# Patient Record
Sex: MALE | Race: WHITE | HISPANIC OR LATINO | Employment: OTHER | URBAN - METROPOLITAN AREA
[De-identification: names, ages, dates, MRNs, and addresses within clinical notes are randomized per-mention and may not be internally consistent; named-entity substitution may affect disease eponyms.]

---

## 2017-11-13 ENCOUNTER — HOSPITAL ENCOUNTER (OUTPATIENT)
Dept: LAB | Facility: MEDICAL CENTER | Age: 47
End: 2017-11-13
Attending: STUDENT IN AN ORGANIZED HEALTH CARE EDUCATION/TRAINING PROGRAM
Payer: COMMERCIAL

## 2017-11-13 LAB
CHOLEST SERPL-MCNC: 193 MG/DL (ref 100–199)
EST. AVERAGE GLUCOSE BLD GHB EST-MCNC: 131 MG/DL
HBA1C MFR BLD: 6.2 % (ref 0–5.6)
HDLC SERPL-MCNC: 42 MG/DL
LDLC SERPL CALC-MCNC: 118 MG/DL
TRIGL SERPL-MCNC: 167 MG/DL (ref 0–149)

## 2017-11-13 PROCEDURE — 80061 LIPID PANEL: CPT

## 2017-11-13 PROCEDURE — 83036 HEMOGLOBIN GLYCOSYLATED A1C: CPT

## 2017-11-13 PROCEDURE — 36415 COLL VENOUS BLD VENIPUNCTURE: CPT

## 2017-12-11 ENCOUNTER — HOSPITAL ENCOUNTER (OUTPATIENT)
Dept: RADIOLOGY | Facility: MEDICAL CENTER | Age: 47
End: 2017-12-11
Attending: STUDENT IN AN ORGANIZED HEALTH CARE EDUCATION/TRAINING PROGRAM
Payer: COMMERCIAL

## 2017-12-11 DIAGNOSIS — M54.5 LOW BACK PAIN, UNSPECIFIED BACK PAIN LATERALITY, UNSPECIFIED CHRONICITY, WITH SCIATICA PRESENCE UNSPECIFIED: ICD-10-CM

## 2017-12-11 PROCEDURE — 72100 X-RAY EXAM L-S SPINE 2/3 VWS: CPT

## 2019-04-11 ENCOUNTER — NON-PROVIDER VISIT (OUTPATIENT)
Dept: OCCUPATIONAL MEDICINE | Facility: CLINIC | Age: 49
End: 2019-04-11

## 2019-04-11 DIAGNOSIS — Z23 NEED FOR HEPATITIS B VACCINATION: ICD-10-CM

## 2019-04-11 PROCEDURE — 90746 HEPB VACCINE 3 DOSE ADULT IM: CPT | Performed by: PREVENTIVE MEDICINE

## 2020-02-23 ENCOUNTER — HOSPITAL ENCOUNTER (EMERGENCY)
Facility: MEDICAL CENTER | Age: 50
End: 2020-02-23
Attending: EMERGENCY MEDICINE
Payer: COMMERCIAL

## 2020-02-23 VITALS
HEIGHT: 67 IN | SYSTOLIC BLOOD PRESSURE: 105 MMHG | DIASTOLIC BLOOD PRESSURE: 78 MMHG | TEMPERATURE: 100.1 F | RESPIRATION RATE: 18 BRPM | OXYGEN SATURATION: 98 % | HEART RATE: 98 BPM | WEIGHT: 208.11 LBS | BODY MASS INDEX: 32.66 KG/M2

## 2020-02-23 DIAGNOSIS — J10.1 INFLUENZA A: ICD-10-CM

## 2020-02-23 LAB
FLUAV RNA SPEC QL NAA+PROBE: POSITIVE
FLUBV RNA SPEC QL NAA+PROBE: NEGATIVE
S PYO AG THROAT QL: NORMAL
SIGNIFICANT IND 70042: NORMAL
SITE SITE: NORMAL
SOURCE SOURCE: NORMAL

## 2020-02-23 PROCEDURE — 87081 CULTURE SCREEN ONLY: CPT

## 2020-02-23 PROCEDURE — 700102 HCHG RX REV CODE 250 W/ 637 OVERRIDE(OP): Performed by: EMERGENCY MEDICINE

## 2020-02-23 PROCEDURE — 87880 STREP A ASSAY W/OPTIC: CPT

## 2020-02-23 PROCEDURE — 99283 EMERGENCY DEPT VISIT LOW MDM: CPT

## 2020-02-23 PROCEDURE — A9270 NON-COVERED ITEM OR SERVICE: HCPCS | Performed by: EMERGENCY MEDICINE

## 2020-02-23 PROCEDURE — 87502 INFLUENZA DNA AMP PROBE: CPT

## 2020-02-23 RX ORDER — OSELTAMIVIR PHOSPHATE 75 MG/1
75 CAPSULE ORAL 2 TIMES DAILY
Qty: 10 CAP | Refills: 0 | Status: SHIPPED | OUTPATIENT
Start: 2020-02-23

## 2020-02-23 RX ORDER — ACETAMINOPHEN 325 MG/1
650 TABLET ORAL ONCE
Status: COMPLETED | OUTPATIENT
Start: 2020-02-23 | End: 2020-02-23

## 2020-02-23 RX ADMIN — ACETAMINOPHEN 650 MG: 325 TABLET, FILM COATED ORAL at 15:30

## 2020-02-23 NOTE — ED TRIAGE NOTES
"C/O sore throat, and non productive cough recurring since yesterday.  Chief Complaint   Patient presents with   • Sore Throat   • Cough     /68   Pulse (!) 110   Temp 37.8 °C (100.1 °F) (Oral)   Resp 20   Ht 1.702 m (5' 7\")   Wt 94.4 kg (208 lb 1.8 oz)   SpO2 99%   BMI 32.60 kg/m²     "

## 2020-02-24 NOTE — DISCHARGE INSTRUCTIONS
Stay home while you are sick.  Use Tylenol and Motrin if needed for fever or discomfort, drink lots of fluids to maintain hydration.  Return here if you develop new or worsening symptoms.  Next year get a flu shot.

## 2020-02-24 NOTE — ED PROVIDER NOTES
ED Provider Note    CHIEF COMPLAINT  Chief Complaint   Patient presents with   • Sore Throat   • Cough       HPI  Noe Cottrell is a 49 y.o. male who presents to the emergency department complaining that he woke up this morning with body aches generalized pain sore throat nonproductive cough.    REVIEW OF SYSTEMS no chest pain no hemoptysis no vomiting or diarrhea.  All other systems negative    PAST MEDICAL HISTORY  History reviewed. No pertinent past medical history.    FAMILY HISTORY  History reviewed. No pertinent family history.    SOCIAL HISTORY  Social History     Socioeconomic History   • Marital status:      Spouse name: Not on file   • Number of children: Not on file   • Years of education: Not on file   • Highest education level: Not on file   Occupational History   • Not on file   Social Needs   • Financial resource strain: Not on file   • Food insecurity     Worry: Not on file     Inability: Not on file   • Transportation needs     Medical: Not on file     Non-medical: Not on file   Tobacco Use   • Smoking status: Never Smoker   • Smokeless tobacco: Never Used   Substance and Sexual Activity   • Alcohol use: Yes     Comment: occasionally   • Drug use: Never   • Sexual activity: Not on file   Lifestyle   • Physical activity     Days per week: Not on file     Minutes per session: Not on file   • Stress: Not on file   Relationships   • Social connections     Talks on phone: Not on file     Gets together: Not on file     Attends Bahai service: Not on file     Active member of club or organization: Not on file     Attends meetings of clubs or organizations: Not on file     Relationship status: Not on file   • Intimate partner violence     Fear of current or ex partner: Not on file     Emotionally abused: Not on file     Physically abused: Not on file     Forced sexual activity: Not on file   Other Topics Concern   • Not on file   Social History Narrative   • Not on file       SURGICAL  "HISTORY  History reviewed. No pertinent surgical history.    CURRENT MEDICATIONS  Home Medications    **Home medications have not yet been reviewed for this encounter**         ALLERGIES  No Known Allergies    PHYSICAL EXAM  VITAL SIGNS: /78   Pulse 98   Temp 37.8 °C (100.1 °F) (Oral)   Resp 18   Ht 1.702 m (5' 7\")   Wt 94.4 kg (208 lb 1.8 oz)   SpO2 98%   BMI 32.60 kg/m²    Oxygen saturation is interpreted as adequate  Constitutional: Awake and nontoxic-appearing  HENT: Mucous membranes are moist throat clear  Eyes: No erythema or discharge  Neck: No lymphadenopathy or meningeal findings  Cardiovascular: Regular rate and rhythm  Lungs: Clear and equal bilaterally with no apparent difficulty breathing  Skin: Warm and dry  Musculoskeletal: No acute bony deformity  Neurologic: Awake verbal and ambulatory    Laboratory  Influenza A test is positive, rapid strep test is negative    MEDICAL DECISION MAKING and DISPOSITION  In the emergency department I have reviewed these findings with the patient and I have written him a prescription for Tamiflu.  I think it is safe for him to go home I recommended Tylenol and Motrin if needed for fever or discomfort and the patient was given a dose of Tylenol here in the ER.  If he is having new or worsening symptoms he is to return for recheck and I have encouraged him to get a flu shot next year    IMPRESSION  1.  Influenza A         Electronically signed by: Heron Denise M.D., 2/23/2020 5:43 PM      "

## 2020-02-25 LAB
S PYO SPEC QL CULT: NORMAL
SIGNIFICANT IND 70042: NORMAL
SITE SITE: NORMAL
SOURCE SOURCE: NORMAL

## 2020-05-20 ENCOUNTER — HOSPITAL ENCOUNTER (OUTPATIENT)
Facility: MEDICAL CENTER | Age: 50
End: 2020-05-20
Payer: COMMERCIAL

## 2020-05-23 LAB
SARS-COV-2 RNA SPEC QL NAA+PROBE: NOT DETECTED
SPECIMEN SOURCE: NORMAL

## 2020-10-15 ENCOUNTER — IMMUNIZATION (OUTPATIENT)
Dept: SOCIAL WORK | Facility: CLINIC | Age: 50
End: 2020-10-15
Payer: COMMERCIAL

## 2020-10-15 DIAGNOSIS — Z23 NEED FOR VACCINATION: ICD-10-CM

## 2020-10-15 PROCEDURE — 90471 IMMUNIZATION ADMIN: CPT | Performed by: REGISTERED NURSE

## 2020-10-15 PROCEDURE — 90686 IIV4 VACC NO PRSV 0.5 ML IM: CPT | Performed by: REGISTERED NURSE

## 2021-04-13 ENCOUNTER — HOSPITAL ENCOUNTER (OUTPATIENT)
Dept: LAB | Facility: MEDICAL CENTER | Age: 51
End: 2021-04-13
Attending: STUDENT IN AN ORGANIZED HEALTH CARE EDUCATION/TRAINING PROGRAM
Payer: COMMERCIAL

## 2021-04-13 LAB
ALBUMIN SERPL BCP-MCNC: 4.7 G/DL (ref 3.2–4.9)
ALBUMIN/GLOB SERPL: 1.7 G/DL
ALP SERPL-CCNC: 73 U/L (ref 30–99)
ALT SERPL-CCNC: 28 U/L (ref 2–50)
ANION GAP SERPL CALC-SCNC: 9 MMOL/L (ref 7–16)
AST SERPL-CCNC: 29 U/L (ref 12–45)
BASOPHILS # BLD AUTO: 0.9 % (ref 0–1.8)
BASOPHILS # BLD: 0.05 K/UL (ref 0–0.12)
BILIRUB SERPL-MCNC: 0.5 MG/DL (ref 0.1–1.5)
BUN SERPL-MCNC: 15 MG/DL (ref 8–22)
CALCIUM SERPL-MCNC: 9.4 MG/DL (ref 8.5–10.5)
CHLORIDE SERPL-SCNC: 103 MMOL/L (ref 96–112)
CHOLEST SERPL-MCNC: 207 MG/DL (ref 100–199)
CO2 SERPL-SCNC: 23 MMOL/L (ref 20–33)
CREAT SERPL-MCNC: 0.96 MG/DL (ref 0.5–1.4)
EOSINOPHIL # BLD AUTO: 0.22 K/UL (ref 0–0.51)
EOSINOPHIL NFR BLD: 4.1 % (ref 0–6.9)
ERYTHROCYTE [DISTWIDTH] IN BLOOD BY AUTOMATED COUNT: 43.8 FL (ref 35.9–50)
EST. AVERAGE GLUCOSE BLD GHB EST-MCNC: 111 MG/DL
FASTING STATUS PATIENT QL REPORTED: NORMAL
GLOBULIN SER CALC-MCNC: 2.7 G/DL (ref 1.9–3.5)
GLUCOSE SERPL-MCNC: 99 MG/DL (ref 65–99)
HBA1C MFR BLD: 5.5 % (ref 4–5.6)
HCT VFR BLD AUTO: 46.8 % (ref 42–52)
HDLC SERPL-MCNC: 42 MG/DL
HGB BLD-MCNC: 16.1 G/DL (ref 14–18)
IMM GRANULOCYTES # BLD AUTO: 0.02 K/UL (ref 0–0.11)
IMM GRANULOCYTES NFR BLD AUTO: 0.4 % (ref 0–0.9)
LDLC SERPL CALC-MCNC: 142 MG/DL
LYMPHOCYTES # BLD AUTO: 1.71 K/UL (ref 1–4.8)
LYMPHOCYTES NFR BLD: 31.5 % (ref 22–41)
MCH RBC QN AUTO: 29.3 PG (ref 27–33)
MCHC RBC AUTO-ENTMCNC: 34.4 G/DL (ref 33.7–35.3)
MCV RBC AUTO: 85.2 FL (ref 81.4–97.8)
MONOCYTES # BLD AUTO: 0.4 K/UL (ref 0–0.85)
MONOCYTES NFR BLD AUTO: 7.4 % (ref 0–13.4)
NEUTROPHILS # BLD AUTO: 3.02 K/UL (ref 1.82–7.42)
NEUTROPHILS NFR BLD: 55.7 % (ref 44–72)
NRBC # BLD AUTO: 0 K/UL
NRBC BLD-RTO: 0 /100 WBC
PLATELET # BLD AUTO: 197 K/UL (ref 164–446)
PMV BLD AUTO: 12 FL (ref 9–12.9)
POTASSIUM SERPL-SCNC: 4 MMOL/L (ref 3.6–5.5)
PROT SERPL-MCNC: 7.4 G/DL (ref 6–8.2)
PSA SERPL-MCNC: 1.2 NG/ML (ref 0–4)
RBC # BLD AUTO: 5.49 M/UL (ref 4.7–6.1)
SODIUM SERPL-SCNC: 135 MMOL/L (ref 135–145)
TRIGL SERPL-MCNC: 117 MG/DL (ref 0–149)
TSH SERPL DL<=0.005 MIU/L-ACNC: 1.61 UIU/ML (ref 0.38–5.33)
WBC # BLD AUTO: 5.4 K/UL (ref 4.8–10.8)

## 2021-04-13 PROCEDURE — 80053 COMPREHEN METABOLIC PANEL: CPT

## 2021-04-13 PROCEDURE — 36415 COLL VENOUS BLD VENIPUNCTURE: CPT

## 2021-04-13 PROCEDURE — 82306 VITAMIN D 25 HYDROXY: CPT

## 2021-04-13 PROCEDURE — 83036 HEMOGLOBIN GLYCOSYLATED A1C: CPT

## 2021-04-13 PROCEDURE — 80061 LIPID PANEL: CPT

## 2021-04-13 PROCEDURE — 84153 ASSAY OF PSA TOTAL: CPT

## 2021-04-13 PROCEDURE — 84443 ASSAY THYROID STIM HORMONE: CPT

## 2021-04-13 PROCEDURE — 85025 COMPLETE CBC W/AUTO DIFF WBC: CPT

## 2021-04-15 LAB — 25(OH)D3 SERPL-MCNC: 39 NG/ML (ref 30–80)

## 2022-07-05 ENCOUNTER — HOSPITAL ENCOUNTER (OUTPATIENT)
Dept: LAB | Facility: MEDICAL CENTER | Age: 52
End: 2022-07-05
Attending: STUDENT IN AN ORGANIZED HEALTH CARE EDUCATION/TRAINING PROGRAM
Payer: COMMERCIAL

## 2022-07-05 LAB
25(OH)D3 SERPL-MCNC: 28 NG/ML (ref 30–100)
ALBUMIN SERPL BCP-MCNC: 4.6 G/DL (ref 3.2–4.9)
ALBUMIN/GLOB SERPL: 1.8 G/DL
ALP SERPL-CCNC: 72 U/L (ref 30–99)
ALT SERPL-CCNC: 20 U/L (ref 2–50)
ANION GAP SERPL CALC-SCNC: 11 MMOL/L (ref 7–16)
AST SERPL-CCNC: 20 U/L (ref 12–45)
BASOPHILS # BLD AUTO: 0.8 % (ref 0–1.8)
BASOPHILS # BLD: 0.04 K/UL (ref 0–0.12)
BILIRUB SERPL-MCNC: 0.5 MG/DL (ref 0.1–1.5)
BUN SERPL-MCNC: 14 MG/DL (ref 8–22)
CALCIUM SERPL-MCNC: 9 MG/DL (ref 8.4–10.2)
CHLORIDE SERPL-SCNC: 107 MMOL/L (ref 96–112)
CHOLEST SERPL-MCNC: 204 MG/DL (ref 100–199)
CO2 SERPL-SCNC: 22 MMOL/L (ref 20–33)
CREAT SERPL-MCNC: 0.97 MG/DL (ref 0.5–1.4)
EOSINOPHIL # BLD AUTO: 0.12 K/UL (ref 0–0.51)
EOSINOPHIL NFR BLD: 2.3 % (ref 0–6.9)
ERYTHROCYTE [DISTWIDTH] IN BLOOD BY AUTOMATED COUNT: 45.8 FL (ref 35.9–50)
EST. AVERAGE GLUCOSE BLD GHB EST-MCNC: 111 MG/DL
FASTING STATUS PATIENT QL REPORTED: NORMAL
GFR SERPLBLD CREATININE-BSD FMLA CKD-EPI: 93 ML/MIN/1.73 M 2
GLOBULIN SER CALC-MCNC: 2.5 G/DL (ref 1.9–3.5)
GLUCOSE SERPL-MCNC: 98 MG/DL (ref 65–99)
HBA1C MFR BLD: 5.5 % (ref 4–5.6)
HCT VFR BLD AUTO: 45.8 % (ref 42–52)
HDLC SERPL-MCNC: 46 MG/DL
HGB BLD-MCNC: 15.6 G/DL (ref 14–18)
IMM GRANULOCYTES # BLD AUTO: 0.03 K/UL (ref 0–0.11)
IMM GRANULOCYTES NFR BLD AUTO: 0.6 % (ref 0–0.9)
LDLC SERPL CALC-MCNC: 134 MG/DL
LYMPHOCYTES # BLD AUTO: 1.71 K/UL (ref 1–4.8)
LYMPHOCYTES NFR BLD: 32.1 % (ref 22–41)
MCH RBC QN AUTO: 29.1 PG (ref 27–33)
MCHC RBC AUTO-ENTMCNC: 34.1 G/DL (ref 33.7–35.3)
MCV RBC AUTO: 85.4 FL (ref 81.4–97.8)
MONOCYTES # BLD AUTO: 0.47 K/UL (ref 0–0.85)
MONOCYTES NFR BLD AUTO: 8.8 % (ref 0–13.4)
NEUTROPHILS # BLD AUTO: 2.95 K/UL (ref 1.82–7.42)
NEUTROPHILS NFR BLD: 55.4 % (ref 44–72)
NRBC # BLD AUTO: 0 K/UL
NRBC BLD-RTO: 0 /100 WBC
PLATELET # BLD AUTO: 197 K/UL (ref 164–446)
PMV BLD AUTO: 10.5 FL (ref 9–12.9)
POTASSIUM SERPL-SCNC: 4 MMOL/L (ref 3.6–5.5)
PROT SERPL-MCNC: 7.1 G/DL (ref 6–8.2)
PSA SERPL-MCNC: 1.5 NG/ML (ref 0–4)
RBC # BLD AUTO: 5.36 M/UL (ref 4.7–6.1)
SODIUM SERPL-SCNC: 140 MMOL/L (ref 135–145)
TRIGL SERPL-MCNC: 119 MG/DL (ref 0–149)
WBC # BLD AUTO: 5.3 K/UL (ref 4.8–10.8)

## 2022-07-05 PROCEDURE — 80053 COMPREHEN METABOLIC PANEL: CPT

## 2022-07-05 PROCEDURE — 84270 ASSAY OF SEX HORMONE GLOBUL: CPT

## 2022-07-05 PROCEDURE — 84403 ASSAY OF TOTAL TESTOSTERONE: CPT

## 2022-07-05 PROCEDURE — 82306 VITAMIN D 25 HYDROXY: CPT

## 2022-07-05 PROCEDURE — 36415 COLL VENOUS BLD VENIPUNCTURE: CPT

## 2022-07-05 PROCEDURE — 80061 LIPID PANEL: CPT

## 2022-07-05 PROCEDURE — 84402 ASSAY OF FREE TESTOSTERONE: CPT

## 2022-07-05 PROCEDURE — 84153 ASSAY OF PSA TOTAL: CPT

## 2022-07-05 PROCEDURE — 83036 HEMOGLOBIN GLYCOSYLATED A1C: CPT

## 2022-07-05 PROCEDURE — 85025 COMPLETE CBC W/AUTO DIFF WBC: CPT

## 2022-07-06 LAB
SHBG SERPL-SCNC: 25 NMOL/L (ref 19–76)
TESTOST FREE MFR SERPL: 2.1 % (ref 1.6–2.9)
TESTOST FREE SERPL-MCNC: 68 PG/ML (ref 47–244)
TESTOST SERPL-MCNC: 327 NG/DL (ref 300–890)

## 2022-08-08 ENCOUNTER — HOSPITAL ENCOUNTER (OUTPATIENT)
Dept: LAB | Facility: MEDICAL CENTER | Age: 52
End: 2022-08-08
Attending: STUDENT IN AN ORGANIZED HEALTH CARE EDUCATION/TRAINING PROGRAM
Payer: COMMERCIAL

## 2022-08-08 LAB
APTT PPP: 27.6 SEC (ref 24.7–36)
CHOLEST SERPL-MCNC: 214 MG/DL (ref 100–199)
FASTING STATUS PATIENT QL REPORTED: NORMAL
FERRITIN SERPL-MCNC: 136 NG/ML (ref 22–322)
HDLC SERPL-MCNC: 46 MG/DL
HIV 1+2 AB+HIV1 P24 AG SERPL QL IA: NORMAL
INR PPP: 1.03 (ref 0.87–1.13)
IRON SATN MFR SERPL: 37 % (ref 15–55)
IRON SERPL-MCNC: 109 UG/DL (ref 50–180)
LDLC SERPL CALC-MCNC: 145 MG/DL
MAGNESIUM SERPL-MCNC: 2.2 MG/DL (ref 1.5–2.5)
PROTHROMBIN TIME: 13.1 SEC (ref 12–14.6)
T PALLIDUM AB SER QL IA: NORMAL
T3FREE SERPL-MCNC: 2.9 PG/ML (ref 2–4.4)
T4 FREE SERPL-MCNC: 1.13 NG/DL (ref 0.93–1.7)
TIBC SERPL-MCNC: 294 UG/DL (ref 250–450)
TRANSFERRIN SERPL-MCNC: 265 MG/DL (ref 200–370)
TRIGL SERPL-MCNC: 116 MG/DL (ref 0–149)
TSH SERPL DL<=0.005 MIU/L-ACNC: 1.59 UIU/ML (ref 0.38–5.33)
UIBC SERPL-MCNC: 185 UG/DL (ref 110–370)
VIT B12 SERPL-MCNC: 536 PG/ML (ref 211–911)

## 2022-08-08 PROCEDURE — 84466 ASSAY OF TRANSFERRIN: CPT

## 2022-08-08 PROCEDURE — 84481 FREE ASSAY (FT-3): CPT

## 2022-08-08 PROCEDURE — 36415 COLL VENOUS BLD VENIPUNCTURE: CPT

## 2022-08-08 PROCEDURE — 83735 ASSAY OF MAGNESIUM: CPT

## 2022-08-08 PROCEDURE — 85730 THROMBOPLASTIN TIME PARTIAL: CPT

## 2022-08-08 PROCEDURE — 82607 VITAMIN B-12: CPT

## 2022-08-08 PROCEDURE — 80061 LIPID PANEL: CPT

## 2022-08-08 PROCEDURE — 84443 ASSAY THYROID STIM HORMONE: CPT

## 2022-08-08 PROCEDURE — 87389 HIV-1 AG W/HIV-1&-2 AB AG IA: CPT

## 2022-08-08 PROCEDURE — 86780 TREPONEMA PALLIDUM: CPT

## 2022-08-08 PROCEDURE — 82728 ASSAY OF FERRITIN: CPT

## 2022-08-08 PROCEDURE — 82525 ASSAY OF COPPER: CPT

## 2022-08-08 PROCEDURE — 83550 IRON BINDING TEST: CPT

## 2022-08-08 PROCEDURE — 81401 MOPATH PROCEDURE LEVEL 2: CPT

## 2022-08-08 PROCEDURE — 86038 ANTINUCLEAR ANTIBODIES: CPT

## 2022-08-08 PROCEDURE — 84439 ASSAY OF FREE THYROXINE: CPT

## 2022-08-08 PROCEDURE — 85610 PROTHROMBIN TIME: CPT

## 2022-08-08 PROCEDURE — 83540 ASSAY OF IRON: CPT

## 2022-08-10 LAB — NUCLEAR IGG SER QL IA: NORMAL

## 2022-08-11 LAB — COPPER SERPL-MCNC: 101.6 UG/DL (ref 70–140)

## 2022-08-16 LAB — TEST NAME 95000: ABNORMAL

## 2022-10-15 ENCOUNTER — PHARMACY VISIT (OUTPATIENT)
Dept: PHARMACY | Facility: MEDICAL CENTER | Age: 52
End: 2022-10-15
Payer: COMMERCIAL

## 2022-10-15 PROCEDURE — RXMED WILLOW AMBULATORY MEDICATION CHARGE: Performed by: INTERNAL MEDICINE

## 2022-10-15 RX ORDER — INFLUENZA A VIRUS A/BRISBANE/02/2018 IVR-190 (H1N1) ANTIGEN (FORMALDEHYDE INACTIVATED), INFLUENZA A VIRUS A/KANSAS/14/2017 X-327 (H3N2) ANTIGEN (FORMALDEHYDE INACTIVATED), INFLUENZA B VIRUS B/PHUKET/3073/2013 ANTIGEN (FORMALDEHYDE INACTIVATED), AND INFLUENZA B VIRUS B/MARYLAND/15/2016 BX-69A ANTIGEN (FORMALDEHYDE INACTIVATED) 15; 15; 15; 15 UG/.5ML; UG/.5ML; UG/.5ML; UG/.5ML
INJECTION, SUSPENSION INTRAMUSCULAR
Qty: 0.5 ML | Refills: 0 | OUTPATIENT
Start: 2022-10-05

## 2022-11-08 ENCOUNTER — HOSPITAL ENCOUNTER (OUTPATIENT)
Dept: RADIOLOGY | Facility: MEDICAL CENTER | Age: 52
End: 2022-11-08
Attending: STUDENT IN AN ORGANIZED HEALTH CARE EDUCATION/TRAINING PROGRAM
Payer: COMMERCIAL

## 2022-11-08 DIAGNOSIS — F03.90 SENILE DEMENTIA, UNCOMPLICATED (HCC): ICD-10-CM

## 2022-11-18 ENCOUNTER — NON-PROVIDER VISIT (OUTPATIENT)
Dept: OCCUPATIONAL MEDICINE | Facility: CLINIC | Age: 52
End: 2022-11-18

## 2022-11-18 DIAGNOSIS — Z23 ENCOUNTER FOR IMMUNIZATION: ICD-10-CM

## 2022-11-18 PROCEDURE — 90746 HEPB VACCINE 3 DOSE ADULT IM: CPT | Performed by: PREVENTIVE MEDICINE

## 2022-12-07 ENCOUNTER — APPOINTMENT (OUTPATIENT)
Dept: RADIOLOGY | Facility: MEDICAL CENTER | Age: 52
End: 2022-12-07
Attending: STUDENT IN AN ORGANIZED HEALTH CARE EDUCATION/TRAINING PROGRAM
Payer: COMMERCIAL

## 2022-12-07 DIAGNOSIS — F03.90 SENILE DEMENTIA, UNCOMPLICATED (HCC): ICD-10-CM

## 2022-12-07 PROCEDURE — 70553 MRI BRAIN STEM W/O & W/DYE: CPT

## 2022-12-07 PROCEDURE — A9576 INJ PROHANCE MULTIPACK: HCPCS | Performed by: STUDENT IN AN ORGANIZED HEALTH CARE EDUCATION/TRAINING PROGRAM

## 2022-12-07 PROCEDURE — 700117 HCHG RX CONTRAST REV CODE 255: Performed by: STUDENT IN AN ORGANIZED HEALTH CARE EDUCATION/TRAINING PROGRAM

## 2022-12-07 RX ADMIN — GADOTERIDOL 20 ML: 279.3 INJECTION, SOLUTION INTRAVENOUS at 16:50

## 2022-12-16 ENCOUNTER — TELEPHONE (OUTPATIENT)
Dept: HEALTH INFORMATION MANAGEMENT | Facility: OTHER | Age: 52
End: 2022-12-16

## 2023-03-08 ENCOUNTER — HOSPITAL ENCOUNTER (OUTPATIENT)
Dept: LAB | Facility: MEDICAL CENTER | Age: 53
End: 2023-03-08
Attending: STUDENT IN AN ORGANIZED HEALTH CARE EDUCATION/TRAINING PROGRAM
Payer: COMMERCIAL

## 2023-03-08 LAB
ALBUMIN SERPL BCP-MCNC: 4.5 G/DL (ref 3.2–4.9)
ALBUMIN/GLOB SERPL: 1.6 G/DL
ALP SERPL-CCNC: 80 U/L (ref 30–99)
ALT SERPL-CCNC: 30 U/L (ref 2–50)
ANION GAP SERPL CALC-SCNC: 9 MMOL/L (ref 7–16)
AST SERPL-CCNC: 26 U/L (ref 12–45)
BASOPHILS # BLD AUTO: 0.7 % (ref 0–1.8)
BASOPHILS # BLD: 0.04 K/UL (ref 0–0.12)
BILIRUB SERPL-MCNC: 0.5 MG/DL (ref 0.1–1.5)
BUN SERPL-MCNC: 13 MG/DL (ref 8–22)
CALCIUM ALBUM COR SERPL-MCNC: 8.8 MG/DL (ref 8.5–10.5)
CALCIUM SERPL-MCNC: 9.2 MG/DL (ref 8.4–10.2)
CHLORIDE SERPL-SCNC: 105 MMOL/L (ref 96–112)
CHOLEST SERPL-MCNC: 115 MG/DL (ref 100–199)
CO2 SERPL-SCNC: 25 MMOL/L (ref 20–33)
CREAT SERPL-MCNC: 0.91 MG/DL (ref 0.5–1.4)
CREAT UR-MCNC: 157.3 MG/DL
EOSINOPHIL # BLD AUTO: 0.21 K/UL (ref 0–0.51)
EOSINOPHIL NFR BLD: 3.5 % (ref 0–6.9)
ERYTHROCYTE [DISTWIDTH] IN BLOOD BY AUTOMATED COUNT: 45.4 FL (ref 35.9–50)
EST. AVERAGE GLUCOSE BLD GHB EST-MCNC: 117 MG/DL
ESTRADIOL SERPL-MCNC: 26.7 PG/ML
FASTING STATUS PATIENT QL REPORTED: NORMAL
GFR SERPLBLD CREATININE-BSD FMLA CKD-EPI: 100 ML/MIN/1.73 M 2
GLOBULIN SER CALC-MCNC: 2.8 G/DL (ref 1.9–3.5)
GLUCOSE SERPL-MCNC: 103 MG/DL (ref 65–99)
HBA1C MFR BLD: 5.7 % (ref 4–5.6)
HCT VFR BLD AUTO: 49.2 % (ref 42–52)
HDLC SERPL-MCNC: 47 MG/DL
HGB BLD-MCNC: 16.3 G/DL (ref 14–18)
IMM GRANULOCYTES # BLD AUTO: 0.03 K/UL (ref 0–0.11)
IMM GRANULOCYTES NFR BLD AUTO: 0.5 % (ref 0–0.9)
LDLC SERPL CALC-MCNC: 51 MG/DL
LYMPHOCYTES # BLD AUTO: 1.45 K/UL (ref 1–4.8)
LYMPHOCYTES NFR BLD: 23.9 % (ref 22–41)
MCH RBC QN AUTO: 28.6 PG (ref 27–33)
MCHC RBC AUTO-ENTMCNC: 33.1 G/DL (ref 33.7–35.3)
MCV RBC AUTO: 86.3 FL (ref 81.4–97.8)
MICROALBUMIN UR-MCNC: <1.2 MG/DL
MICROALBUMIN/CREAT UR: NORMAL MG/G (ref 0–30)
MONOCYTES # BLD AUTO: 0.34 K/UL (ref 0–0.85)
MONOCYTES NFR BLD AUTO: 5.6 % (ref 0–13.4)
NEUTROPHILS # BLD AUTO: 3.99 K/UL (ref 1.82–7.42)
NEUTROPHILS NFR BLD: 65.8 % (ref 44–72)
NRBC # BLD AUTO: 0 K/UL
NRBC BLD-RTO: 0 /100 WBC
PLATELET # BLD AUTO: 200 K/UL (ref 164–446)
PMV BLD AUTO: 11.2 FL (ref 9–12.9)
POTASSIUM SERPL-SCNC: 4.5 MMOL/L (ref 3.6–5.5)
PROT SERPL-MCNC: 7.3 G/DL (ref 6–8.2)
RBC # BLD AUTO: 5.7 M/UL (ref 4.7–6.1)
SODIUM SERPL-SCNC: 139 MMOL/L (ref 135–145)
T3FREE SERPL-MCNC: 3.13 PG/ML (ref 2–4.4)
T4 FREE SERPL-MCNC: 1.07 NG/DL (ref 0.93–1.7)
TESTOST SERPL-MCNC: 316 NG/DL (ref 175–781)
TRIGL SERPL-MCNC: 87 MG/DL (ref 0–149)
TSH SERPL DL<=0.005 MIU/L-ACNC: 1.4 UIU/ML (ref 0.38–5.33)
WBC # BLD AUTO: 6.1 K/UL (ref 4.8–10.8)

## 2023-03-08 PROCEDURE — 82043 UR ALBUMIN QUANTITATIVE: CPT

## 2023-03-08 PROCEDURE — 82570 ASSAY OF URINE CREATININE: CPT

## 2023-03-08 PROCEDURE — 80053 COMPREHEN METABOLIC PANEL: CPT

## 2023-03-08 PROCEDURE — 84481 FREE ASSAY (FT-3): CPT

## 2023-03-08 PROCEDURE — 84443 ASSAY THYROID STIM HORMONE: CPT

## 2023-03-08 PROCEDURE — 82670 ASSAY OF TOTAL ESTRADIOL: CPT

## 2023-03-08 PROCEDURE — 80061 LIPID PANEL: CPT

## 2023-03-08 PROCEDURE — 36415 COLL VENOUS BLD VENIPUNCTURE: CPT

## 2023-03-08 PROCEDURE — 84439 ASSAY OF FREE THYROXINE: CPT

## 2023-03-08 PROCEDURE — 84402 ASSAY OF FREE TESTOSTERONE: CPT

## 2023-03-08 PROCEDURE — 84403 ASSAY OF TOTAL TESTOSTERONE: CPT

## 2023-03-08 PROCEDURE — 83036 HEMOGLOBIN GLYCOSYLATED A1C: CPT

## 2023-03-08 PROCEDURE — 85025 COMPLETE CBC W/AUTO DIFF WBC: CPT

## 2023-03-09 LAB — TESTOST FREE SERPL-MCNC: 65 PG/ML (ref 47–244)

## 2023-04-25 ENCOUNTER — HOSPITAL ENCOUNTER (OUTPATIENT)
Dept: LAB | Facility: MEDICAL CENTER | Age: 53
End: 2023-04-25
Attending: STUDENT IN AN ORGANIZED HEALTH CARE EDUCATION/TRAINING PROGRAM
Payer: COMMERCIAL

## 2023-04-25 LAB
BASOPHILS # BLD AUTO: 1 % (ref 0–1.8)
BASOPHILS # BLD: 0.07 K/UL (ref 0–0.12)
EOSINOPHIL # BLD AUTO: 1.72 K/UL (ref 0–0.51)
EOSINOPHIL NFR BLD: 24.8 % (ref 0–6.9)
ERYTHROCYTE [DISTWIDTH] IN BLOOD BY AUTOMATED COUNT: 45 FL (ref 35.9–50)
FOLATE SERPL-MCNC: 11.3 NG/ML
HCT VFR BLD AUTO: 46.8 % (ref 42–52)
HGB BLD-MCNC: 15.8 G/DL (ref 14–18)
IMM GRANULOCYTES # BLD AUTO: 0.03 K/UL (ref 0–0.11)
IMM GRANULOCYTES NFR BLD AUTO: 0.4 % (ref 0–0.9)
LYMPHOCYTES # BLD AUTO: 2.1 K/UL (ref 1–4.8)
LYMPHOCYTES NFR BLD: 30.3 % (ref 22–41)
MCH RBC QN AUTO: 28.8 PG (ref 27–33)
MCHC RBC AUTO-ENTMCNC: 33.8 G/DL (ref 33.7–35.3)
MCV RBC AUTO: 85.2 FL (ref 81.4–97.8)
MONOCYTES # BLD AUTO: 0.4 K/UL (ref 0–0.85)
MONOCYTES NFR BLD AUTO: 5.8 % (ref 0–13.4)
NEUTROPHILS # BLD AUTO: 2.62 K/UL (ref 1.82–7.42)
NEUTROPHILS NFR BLD: 37.7 % (ref 44–72)
NRBC # BLD AUTO: 0 K/UL
NRBC BLD-RTO: 0 /100 WBC
PLATELET # BLD AUTO: 178 K/UL (ref 164–446)
PMV BLD AUTO: 11 FL (ref 9–12.9)
RBC # BLD AUTO: 5.49 M/UL (ref 4.7–6.1)
VIT B12 SERPL-MCNC: 474 PG/ML (ref 211–911)
WBC # BLD AUTO: 6.9 K/UL (ref 4.8–10.8)

## 2023-04-25 PROCEDURE — 82746 ASSAY OF FOLIC ACID SERUM: CPT

## 2023-04-25 PROCEDURE — 36415 COLL VENOUS BLD VENIPUNCTURE: CPT

## 2023-04-25 PROCEDURE — 85025 COMPLETE CBC W/AUTO DIFF WBC: CPT

## 2023-04-25 PROCEDURE — 82607 VITAMIN B-12: CPT

## 2023-04-25 PROCEDURE — 83655 ASSAY OF LEAD: CPT

## 2023-04-27 LAB — LEAD BLDV-MCNC: <2 UG/DL

## 2023-05-03 ENCOUNTER — TELEPHONE (OUTPATIENT)
Dept: NEUROLOGY | Facility: MEDICAL CENTER | Age: 53
End: 2023-05-03
Payer: COMMERCIAL

## 2023-05-03 NOTE — TELEPHONE ENCOUNTER
NEUROLOGY PATIENT PRE-VISIT PLANNING     Patient was NOT contacted to complete PVP.  Note: Patient will not be contacted if there is no indication to call.     Patient Appointment is scheduled as: New Patient     Is visit type and length scheduled correctly? Yes    [employer] Patient is checked in Patient Demographics? Yes    3.   Is referral attached to visit? Yes    4. Were records received from referring provider? Yes    4. Patient was contacted to have someone accompany them to visit.     5. Is this appointment scheduled as a Hospital Follow-Up?  NO    6. Does the patient require any pre procedure or post procedure follow up? No    7. If any orders were placed at last visit or intended to be done for this visit do we have Results/Consult Notes? No  Labs - Labs were not ordered at last office visit.  Imaging - Imaging was not ordered at last office visit.  Referrals - No referrals were ordered at last office visit.  Note: If patient appointment is for lab or imaging review and patient did not complete the studies, check with provider if OK to reschedule patient until completed.    8. If patient appointment is for Botox - is order pended for provider? N/A

## 2023-05-11 ENCOUNTER — OFFICE VISIT (OUTPATIENT)
Dept: NEUROLOGY | Facility: MEDICAL CENTER | Age: 53
End: 2023-05-11
Attending: PSYCHIATRY & NEUROLOGY
Payer: COMMERCIAL

## 2023-05-11 VITALS
TEMPERATURE: 97 F | DIASTOLIC BLOOD PRESSURE: 84 MMHG | OXYGEN SATURATION: 96 % | HEART RATE: 69 BPM | BODY MASS INDEX: 30.73 KG/M2 | SYSTOLIC BLOOD PRESSURE: 126 MMHG | WEIGHT: 196.21 LBS

## 2023-05-11 DIAGNOSIS — F02.B0 MODERATE EARLY ONSET ALZHEIMER'S DEMENTIA WITHOUT BEHAVIORAL DISTURBANCE, PSYCHOTIC DISTURBANCE, MOOD DISTURBANCE, OR ANXIETY (HCC): ICD-10-CM

## 2023-05-11 DIAGNOSIS — G30.0 MODERATE EARLY ONSET ALZHEIMER'S DEMENTIA WITHOUT BEHAVIORAL DISTURBANCE, PSYCHOTIC DISTURBANCE, MOOD DISTURBANCE, OR ANXIETY (HCC): ICD-10-CM

## 2023-05-11 PROCEDURE — 3079F DIAST BP 80-89 MM HG: CPT | Performed by: PSYCHIATRY & NEUROLOGY

## 2023-05-11 PROCEDURE — 3074F SYST BP LT 130 MM HG: CPT | Performed by: PSYCHIATRY & NEUROLOGY

## 2023-05-11 PROCEDURE — 99417 PROLNG OP E/M EACH 15 MIN: CPT | Performed by: PSYCHIATRY & NEUROLOGY

## 2023-05-11 PROCEDURE — 99211 OFF/OP EST MAY X REQ PHY/QHP: CPT | Performed by: PSYCHIATRY & NEUROLOGY

## 2023-05-11 PROCEDURE — 99205 OFFICE O/P NEW HI 60 MIN: CPT | Performed by: PSYCHIATRY & NEUROLOGY

## 2023-05-11 RX ORDER — CYANOCOBALAMIN 1000 UG/ML
INJECTION, SOLUTION INTRAMUSCULAR; SUBCUTANEOUS
COMMUNITY
Start: 2023-05-01

## 2023-05-11 RX ORDER — GALANTAMINE HYDROBROMIDE 4 MG/1
TABLET, FILM COATED ORAL
COMMUNITY
Start: 2023-04-14 | End: 2023-11-20

## 2023-05-11 RX ORDER — DONEPEZIL HYDROCHLORIDE 10 MG/1
10 TABLET, FILM COATED ORAL DAILY
COMMUNITY
Start: 2023-03-20

## 2023-05-11 ASSESSMENT — MONTREAL COGNITIVE ASSESSMENT (MOCA)
11. FOR EACH PAIR OF WORDS, WHAT CATEGORY DO THEY BELONG TO (OUT OF 2): 0/2
WHAT IS THE TOTAL SCORE (OUT OF 30): 12
9. REPEAT EACH SENTENCE: 1/2
WHAT IS THE VERSION OF MOCA ADMINISTERED: 8.1
8. SERIAL SUBTRACTION OF 7S: 1/5
6. READ LIST OF DIGITS [FORWARD/BACKWARD]: 1/2
5. MEMORY TRIALS: SECOND TRIAL
2. COPY DRAWING: 0/1
DELAYED RECALL SUBSCORE: 0/5
10. [FLUENCY] NAME WORDS STARTING WITH DESIGNATED LETTER: 0/1
7. [VIGILENCE] TAP WHEN HEARING DESIGNATED LETTER: 1/1
4. NAME EACH OF THE THREE ANIMALS SHOWN: 2/3
3. DRAW A CLOCK: CONTOUR, NUMBERS, HANDS: 1/3
1. ALTERNATING TRAIL MAKING: 0/1
ORIENTATION SUBSCORE: 5/6

## 2023-05-11 ASSESSMENT — FIBROSIS 4 INDEX: FIB4 SCORE: 1.44

## 2023-05-11 NOTE — PROGRESS NOTES
"Reason for Neurology Consult:   Concern for Dementia    History of present illness:    Noe Leon 53 y.o. right handed gentleman who is here with his wife and son.  He has a 9th grade education in [place].    He was  at the [employer] for many years (nearly 15)  and just took another position recently> .      When asked about his memory and if it's changed and he identifies that in the last 6 months he recognizes that he has to think \"it 2 or 3 times\" such as talking about something to her wife and he can forget what he said.   There was difficulty in \"dealing with people\" (if someone interrupts him he can forget his train of thought easily).    Son noticed about 2 years ago that back at that time he was having difficulty using [employer] Apps and making him panicky and just sending emails. 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    He will frequently repeat himself and within 10 minutes he can repeat multiple times within 1 hour and this has been ongoing for over 9-12 months.    He is easily anxious when trying simple tasks such as drawing a clock and he could do a simple clock task.    He is having harder times fixing things (like a Doggie Door) and even dating back to his wife 3 years ago when wife was going to prepare a barbeque which Noe was going to do but he had completely forgotten that conversation.    He has difficulty making a coffee and he will open lots of cabinets to location the different things he needs to make coffee.    Sometimes he can't send a text message 3 years ago per son and wife.    Speech-language seems preserved to son and wife.    No history of psychotic features or parkinsonism in the recent 6-12 months.  No dysarthria,dysphagia in the recent months.    Denies being or feeling depressed or wanting,planning or attempting to end life in recent months to 2-3 years.    No sundowning behaviors noticed per wife or " son.    No sensory disturbances of feet,toes or hands in the recent months.    No seizure type episodes noted or admitted to in recent days to few years (focal or generalized type events).    Family Hx: no  history of cognitive,memory or jim dementia issues known in his blood pressure in 1st or 2nd degree relatives.        There are no problems to display for this patient.      Past medical history:   No past medical history on file.    Past surgical history:   No past surgical history on file.      Social history:   Social History     Socioeconomic History    Marital status:      Spouse name: Not on file    Number of children: Not on file    Years of education: Not on file    Highest education level: Not on file   Occupational History    Not on file   Tobacco Use    Smoking status: Never    Smokeless tobacco: Never   Vaping Use    Vaping Use: Never used   Substance and Sexual Activity    Alcohol use: Not Currently    Drug use: Never    Sexual activity: Not on file   Other Topics Concern    Not on file   Social History Narrative    Not on file     Social Determinants of Health     Financial Resource Strain: Not on file   Food Insecurity: Not on file   Transportation Needs: Not on file   Physical Activity: Not on file   Stress: Not on file   Social Connections: Not on file   Intimate Partner Violence: Not on file   Housing Stability: Not on file       Family history:   No family history on file.      Current medications:   Current Outpatient Medications   Medication    donepezil (ARICEPT) 10 MG tablet    galantamine (REMINYL) 4 MG Tab    influenza vaccine quad (FLUZONE QUADRIVALENT) 0.5 ML Suspension Prefilled Syringe injection    oseltamivir (TAMIFLU) 75 MG Cap     No current facility-administered medications for this visit.       Medication Allergy:  No Known Allergies        Physical examination:   Vitals:    05/11/23 1624   BP: 126/84   BP Location: Left arm   Patient Position: Sitting   BP Cuff Size:  Adult   Pulse: 69   Temp: 36.1 °C (97 °F)   TempSrc: Temporal   SpO2: 96%   Weight: 89 kg (196 lb 3.4 oz)       Normal cephalic atraumatic.  There is full range of movement around the neck in all directions without restrictions or discrete pain evoked triggers.  No lower extremity edema.      Neurological  Exam:      [employer] Cognitive Assessment (MOCA) Version 7.1    Years of Education: 9th grade education.    TOTAL SCORE: 13/30  (to be scanned into the MEDIA section in the E.M.R.)    [employer] Cognitive Assessment (MOCA) Version Number: 8.1   VISUOSPACIAL / EXECUTIVE   Clock Drawin/3 (very poorly done; left out numbers, spacing poor of the numbers and could not figure out time given to him)  Spatial Drawin/1 (trails poorly done)  Cube Drawin/1 (very poorly done)    NAMING  Namin/3    MEMORY  Memory: Second trial    ATTENTION  Digits: 1/2  Letters: 1/1  Subtraction: 1/5 (this was very hard for him)    LANGUAGE  Repeat Phrases: 1/2  Fluency: 0/1 (got 4 words beginning with F in 1 minute)    ABSTRACTION  Abstraction: 0/2 (very concrete thinking)    DELAYED RECALL  Recall words: 0/5 (could not get face,velvet or laurie even with multiple choice cues and had difficulty stating Islam or red except with multiple choice cues.)  Category Cue (if applicable):    Multiple Choice Cue (if applicable):     ORIENTATION  Orientation: 5/6 (thought it was May 5th)    Add 1 point if less than or equal to 12 yr education level:     MOCA TOTAL SCORE:  13 /30  Biomechanical/Visual Limitations (if applicable):          Mental status: Awake, alert and fully oriented to person, place, time, and situation. Normal attention and concentration.  Did not appear/act combative,irritable,anxious,paranoid/delusional or aggressive to or with me.    Speech and language: Speech is fluent without errors, clear, and intact to reading.     Follows 3 step motor commands in sequence without significant delay and correctly.    Cranial nerve  exam:  II: Pupils are equally round and reactive to light. Visual fields are intact by confrontation.  III, IV, VI: EOMI, no diplopia, no ptosis.  V: Sensation to light touch is normal over V1-3 distributions bilaterally.  .  VII: Facial movements are symmetrical. There is no facial droop. .  VIII: Hearing intact to soft speech and finger rub bilaterally  IX: Palate elevates symmetrically, uvula is midline. Dysarthria is not present.  XI: Shoulder shrug are symmetrical and strong.   XII: Tongue protrudes midline.      Motor exam:  Muscle tone is normal in all 4 limbs. and No abnormal movements appreciated.    Muscle strength:    Neck Flexors/Extensors: 5/5       Right  Left  Deltoid   5/5  5/5      Biceps   5/5  5/5  Triceps  5/5  5/5   Wrist extensors 5/5  5/5  Wrist flexors  5/5  5/5     5/5  5/5  Interossei  5/5  5/5  Thenar (APB)  5/5  5/5   Hip flexors  5/5  5/5  Quadriceps  5/5  5/5    Hamstrings  5/5  5/5  Dorsiflexors  5/5  5/5  Plantarflexors  5/5  5/5  Toe extension  5/5  5/5        Reflexes:       Right  Left  Biceps   2/4  2/4  Triceps             2/4  2/4  Brachioradialis    2/4  2/4  Knee jerk  2/4  2/4  Ankle jerk  2/4  2/4     Frontal release signs are absent    bilaterally toes are downgoing to plantar stimulation..    Coordination (finger-to-nose, heel/knee/shin, rapid alternating movements) was normal.     There was no ataxia, no tremors, and no dysmetria.     Station and gait were normal. Easily stands up from exam chair without retropulsion,veering,leaning,swaying (to either side).       Labs and Tests:     NEUROIMAGING:  Brain MRI 12/2022       IMPRESSION:     1.  In view of history of memory loss, there is no MR evidence of normal pressure hydrocephalus, severe chronic microvascular ischemic disease or any specific neuro degenerative pattern of volume loss.  2.  Mild diffuse volume loss.  3.  A few punctate nonspecific T2 hyperintensities in the supratentorial parenchyma likely  representing nonspecific foci of gliosis.           Exam Ended: 12/07/22  4:49 PM Last Resulted: 12/08/22  5:45 AM              Labs reviewed from 4/25/2023    Notes          Component Ref Range & Units 2 mo ago 10 mo ago 2 yr ago 5 yr ago   Glycohemoglobin 4.0 - 5.6 % 5.7 High   5.5 CM  5.5 CM  6.2 High  R, CM    Comment: Increased risk for diabetes:  5.7           Component Ref Range & Units 2 mo ago 9 mo ago 10 mo ago 2 yr ago 5 yr ago   Cholesterol,Tot 100 - 199 mg/dL 115  214 High   204 High   207 High   193    Triglycerides 0 - 149 mg/dL 87  116  119  117  167 High     HDL >=40 mg/dL 47  46  46  42  42    LDL <100 mg/dL 51  145 High   134 High   142 High   118 High         TSH  Order: [id number] - Reflex for Order [id number]  Status: Final result     Visible to patient: Yes (seen)     Next appt: 05/18/2023 at 10:15 AM in Cardiology ([facility] EXAM 10 ECHO)     0 Result Notes         Component Ref Range & Units 2 mo ago 9 mo ago 2 yr ago   TSH 0.380 - 5.330 uIU/mL 1.400  1.590 CM  1.610 CM    Comment: The 2011 American Thyroid Association (MARTIR) guidelines   recommended that the interpretation of thyroid function in   pregnancy be based on trimester specific reference ranges.     1st Trimester  0.100-2.500 mIU/L   2nd Trimester  0.200-3.000 mIU/L   3rd Trimester  0.300-3.500 mIU/L     These established reference ranges have not been validated   at [facility].           Result Notes       Component Ref Range & Units 2 wk ago   Folate -Folic Acid >4.0 ng/mL 11.3           0 Result Notes        Component Ref Range & Units 2 wk ago 9 mo ago   Vitamin B12 -True Cobalamin 211 - 911 pg/mL 474  536    Resulting Agency  M M              Impression/Plans/Recommendations:    Mild to Moderate Stage of Dementia (probable and  likelyAlzheimer's Disease) given type of symptoms described by family and very slow evolution.    MOCA score today of 13/30.  Very poor short term memory, difficulty with word fluency, poor  "naming, poor visual spatial and executive functioning on MOCA.    Functional Activity Questionnaire score today between 13-15 per son and wife.    Having more and more problems at  home doing simple tasks such as making coffee, sending texts or emails.      Today, I reviewed the clinical criteria and reviewed several  scenarios of the differences being using the medical terms of normal brain aging (age associated memory impairment),  Mild Cognitive Impairment (MCI) and Dementia.    Dementia  is a syndrome but statistically and for the majority of patients  occurs due  to a more rapid aging of the brain tissue or potentially from injury to certain parts of one's brain ( often from such contributing factors as  the cumulative effects of alcohol, from one or more ischemic or hemmorhagic stroke(s), from neurodegeneration or long standing with/without suboptimally controlled Hypertension). It is for some of these potential factors as to why I recommend a brain imaging test (Head CT or Brain MRI) be done for the 1st time or in certain circumstances repeated for comparison purposes  as such imaging can suggest one or more factors as to the reason(s) for the person's cognitive/memory changes. In fact, a normal or \"age related\" finding on a brain imaging test in and of itself is useful clinical and objective information to have in the evaluation of a person who has either an acute, evolving or even chronic (months to years) long cognitive/memory complaint.    Additional factors or contributors to Brain Health issues can be summarized in several books/references which I discussed as well today.     Goals going forwards include:    A. Paying attention to one's risk factors and reducing their prevalence or potential impact on one's changing memory/thinking> an excellent example would be to stop smoking, reduce or eliminate alcohol use (depending on the amount and frequency of usage), maintain good blood pressure control by " buying a digital arm blood pressure cuff such as an OMRON Series 3 or 5 and checking one's blood pressure atleast 3 days per week (in the am and early afternoon) that the numbers are under 140/90 and working as needed with the primary care doctor  to optimize blood pressure control).    B. Encouraging proper sleep hygiene which for most adults is 7 to 8 hours of uninterrupted sleep per night.      C. Encouraging some form of exercise preferable aerobic forms to be done (4 to 5 days per week- 30 minutes minimum per day)> 150 minutes per week as a goal. Example activities could include fast walking (up a slight incline), jogging, cycling (road or stationary), swimming,tennis. Essentially, even basic walking on a flat surface or a treadmill would be better than doing nothing.    D. Maintaining or forming new social contacts with family and friends  and a positive attitude despite the concerns and/or ongoing issues with thinking and/or memory.    E. Eating well which means a diet low in salt  (under 2 grams per day), sugar and saturated fat.    F. Maintaining one's BMI (Body Mass Index) under 30.    G. Consideration of the use of cognitive enhancers (acetylcholinerase inhibitors such as Aricept vs an NMDA Receptor agent like Namenda). Pros and cons of such compounds in terms of predicted efficacy and side effects profiles reviewed.  He will remain on Aricept 10 mg a day and stop Galantamine since both drugs works the same.    He has been on Aricept for 6 months or so.  Side effects reviewed with him,son and wife and we also discussed consideration in future of adding or using Memantine (hand outs given to son about this medication).    H. Hold off on Brain PET Scan- reviewed consideration with family of doing this test which I feel will very likely be abnormal.    I. I am keeping up with editorials and  comments from  many academic neurologists throughout the [place] who are writing reviews and summaries of the present state  of this provisionally approved anti amyloid compound (aducanumab) and Leqembi and Lecanumba.      Based on the critique of the data so far,  there are clear risks of using these compounds including the cumulative risks of microfibrosis of the cortical brain tissue from the effects of the inflammatory removal of amyloid , the risks of potentially giving or needing to get an acute clot busting medication (like Teneceplase) to treat an acute ischemic stroke in evolution and the longer term risks of spontaneous brain bleeds and brain swelling (some of which can be life threatening events).    I have discussed with the patient and family today that given  the great controversy about the study's data and analysis of the lack of clinical  efficacy to this point in time, I feel that I need to wait and see reasonable post marketing data and/or more robust efficacy data supported by the academic community and/or the American Academy of Neurology  before making a commitment to prescribe such a compound(s)  and  where there is more than  a minor/minimal amount of risk to the patient involved in being administered this compound on a chronic (monthly) basis.       I have read that several senior research neurologists have even commented the compound did nothing clinically meaningful or useful and moreover there was no  clear evidence it prevented the clinical deterioration  of the patients' condition despite potentially removing some amyloid from their brain(s)  tissue.      Based on the critique of the data so far,  there was no  clear scientific evidence this anti amyloid intravenous compound reduced or halted the underlying disease or slowed down the inherent clinical deterioration expected with the Alzheimer's type of Dementia. The clinical data did also not suggest that activities of daily living were improved upon with this compound.     I feel that I need to wait and see reasonable post marketing data and/or more robust  efficacy data supported by the academic community and/or the American Academy of Neurology  before making a commitment to prescribe such a compound and  where there is more than  a minor/minimal amount of risk to the patient involved in being administered this compound on a chronic (monthly) basis.  Family in agreement to not use any of the 3 potential IV anti amyloid compounds.    J. Life style factors reviewed- importance of diet,sleep,walking (some form of exercise 4-5 days per week for 30 minutes minimum).    K. See no reason at this time for another Brain MRI, CSF study or EEG.    L. I have advised Noe not to drive nor go back to work at this time and to seek permanent disability> note written to his [employer] work today and that note given to Noe and his son.    TERRELL Fall's Connnect Form filled out today.    I have performed  a history and physical exam and a directed /focused  ROS today.    Total time spent today or this patient's care was 76 minutes  and included reviewing  the diagnostic workup to date (such as labs and imaging as well as interpreting such tests relevant to this patient's neurological condition),  reviewing/obtaining separately obtained history (from patient and/or accompanying family members)  for today's neurological problem(s) ,counseling and educating the patient and family member on issues related to cognition/memory and cognitive health factors and documenting  the clinical information in the EMR.    Follow up in about 6 months with me.        Ayush Dodson MD  [facility].   [facility]

## 2023-05-11 NOTE — LETTER
May 11, 2023        Re:  Noe Leon   1970         To [facility].    I saw Noe today with his son and wife.    He has a neurological condition that is causing increasing problems with his memory and thinking abilities which I don't expect to improve and will likely cause his more difficulties doing his job as a .    Given what I know about Noe's neurological condition I would strongly support him getting permanent disability at this time and would advocate to NOT go back to work now.      Sincerely,                               Ayush Dodson M.D.  Electronically Signed

## 2023-05-15 ENCOUNTER — TELEPHONE (OUTPATIENT)
Dept: NEUROLOGY | Facility: MEDICAL CENTER | Age: 53
End: 2023-05-15
Payer: COMMERCIAL

## 2023-05-15 NOTE — TELEPHONE ENCOUNTER
[facility]  [phone]  Daughter Orquidea called states dr Dodson provided a letter for pt to submit to his employer([employer]) for disability.  Pt would like to wait to submit this form, because he feels ok to go back to work, states he will want dr Dodson to provide a later date when the form gets submitted.

## 2023-05-29 ENCOUNTER — APPOINTMENT (OUTPATIENT)
Dept: CARDIOLOGY | Facility: MEDICAL CENTER | Age: 53
End: 2023-05-29
Attending: STUDENT IN AN ORGANIZED HEALTH CARE EDUCATION/TRAINING PROGRAM
Payer: COMMERCIAL

## 2023-05-31 ENCOUNTER — DOCUMENTATION (OUTPATIENT)
Dept: NEUROLOGY | Facility: MEDICAL CENTER | Age: 53
End: 2023-05-31
Payer: COMMERCIAL

## 2023-05-31 ENCOUNTER — TELEPHONE (OUTPATIENT)
Dept: NEUROLOGY | Facility: MEDICAL CENTER | Age: 53
End: 2023-05-31
Payer: COMMERCIAL

## 2023-05-31 DIAGNOSIS — Z76.0 MEDICATION REFILL: ICD-10-CM

## 2023-05-31 RX ORDER — DONEPEZIL HYDROCHLORIDE 10 MG/1
10 TABLET, FILM COATED ORAL NIGHTLY
Qty: 341 TABLET | Refills: 0 | Status: SHIPPED | OUTPATIENT
Start: 2023-05-31 | End: 2024-05-06

## 2023-05-31 NOTE — TELEPHONE ENCOUNTER
[phone]  (Noe)Son of pt called states they would like to get a script for the medication dr Dodson had mentioned he recommends to help with pt condition. Does not remember name of meds

## 2023-06-01 ENCOUNTER — DOCUMENTATION (OUTPATIENT)
Dept: NEUROLOGY | Facility: MEDICAL CENTER | Age: 53
End: 2023-06-01
Payer: COMMERCIAL

## 2023-06-01 DIAGNOSIS — F02.B0 MODERATE EARLY ONSET ALZHEIMER'S DEMENTIA WITHOUT BEHAVIORAL DISTURBANCE, PSYCHOTIC DISTURBANCE, MOOD DISTURBANCE, OR ANXIETY (HCC): ICD-10-CM

## 2023-06-01 DIAGNOSIS — G30.0 MODERATE EARLY ONSET ALZHEIMER'S DEMENTIA WITHOUT BEHAVIORAL DISTURBANCE, PSYCHOTIC DISTURBANCE, MOOD DISTURBANCE, OR ANXIETY (HCC): ICD-10-CM

## 2023-06-01 RX ORDER — MEMANTINE HYDROCHLORIDE 10 MG/1
TABLET ORAL
Qty: 120 TABLET | Refills: 6 | Status: SHIPPED | OUTPATIENT
Start: 2023-06-01 | End: 2024-06-01

## 2023-06-01 NOTE — TELEPHONE ENCOUNTER
Ayush Dodson M.D.  Jeanette Harrison, [facility]  Caller: Unspecified (Yesterday,  1:18 PM)  Jeanette     I just reviewed my note and this gentleman should NOT be taking Galantamine but should still be taking Aricept 10 mg a day.     I will refill his Aricept medication.     Called spoke with son let him know per dr Dodson. Agreed.

## 2023-06-02 NOTE — PROGRESS NOTES
Noe will  be starting Memantine- this was reviewed previously with Noe and Kath.  Gradual titration up to 10 mg PO BID (20 mg a day) over 3-4 weeks.

## 2023-07-24 ENCOUNTER — HOSPITAL ENCOUNTER (OUTPATIENT)
Dept: LAB | Facility: MEDICAL CENTER | Age: 53
End: 2023-07-24
Attending: STUDENT IN AN ORGANIZED HEALTH CARE EDUCATION/TRAINING PROGRAM
Payer: COMMERCIAL

## 2023-07-24 LAB
ALBUMIN SERPL BCP-MCNC: 4.3 G/DL (ref 3.2–4.9)
ALBUMIN/GLOB SERPL: 1.5 G/DL
ALP SERPL-CCNC: 78 U/L (ref 30–99)
ALT SERPL-CCNC: 21 U/L (ref 2–50)
ANION GAP SERPL CALC-SCNC: 13 MMOL/L (ref 7–16)
AST SERPL-CCNC: 20 U/L (ref 12–45)
BASOPHILS # BLD AUTO: 0.9 % (ref 0–1.8)
BASOPHILS # BLD: 0.06 K/UL (ref 0–0.12)
BILIRUB SERPL-MCNC: 0.6 MG/DL (ref 0.1–1.5)
BUN SERPL-MCNC: 14 MG/DL (ref 8–22)
CALCIUM ALBUM COR SERPL-MCNC: 9.2 MG/DL (ref 8.5–10.5)
CALCIUM SERPL-MCNC: 9.4 MG/DL (ref 8.4–10.2)
CHLORIDE SERPL-SCNC: 105 MMOL/L (ref 96–112)
CHOLEST SERPL-MCNC: 173 MG/DL (ref 100–199)
CO2 SERPL-SCNC: 23 MMOL/L (ref 20–33)
CREAT SERPL-MCNC: 1 MG/DL (ref 0.5–1.4)
EOSINOPHIL # BLD AUTO: 0.23 K/UL (ref 0–0.51)
EOSINOPHIL NFR BLD: 3.4 % (ref 0–6.9)
ERYTHROCYTE [DISTWIDTH] IN BLOOD BY AUTOMATED COUNT: 46.6 FL (ref 35.9–50)
EST. AVERAGE GLUCOSE BLD GHB EST-MCNC: 126 MG/DL
FASTING STATUS PATIENT QL REPORTED: NORMAL
GFR SERPLBLD CREATININE-BSD FMLA CKD-EPI: 89 ML/MIN/1.73 M 2
GLOBULIN SER CALC-MCNC: 2.9 G/DL (ref 1.9–3.5)
GLUCOSE SERPL-MCNC: 106 MG/DL (ref 65–99)
HBA1C MFR BLD: 6 % (ref 4–5.6)
HCT VFR BLD AUTO: 46.3 % (ref 42–52)
HDLC SERPL-MCNC: 49 MG/DL
HGB BLD-MCNC: 15.6 G/DL (ref 14–18)
IMM GRANULOCYTES # BLD AUTO: 0.02 K/UL (ref 0–0.11)
IMM GRANULOCYTES NFR BLD AUTO: 0.3 % (ref 0–0.9)
LDLC SERPL CALC-MCNC: 99 MG/DL
LYMPHOCYTES # BLD AUTO: 2.41 K/UL (ref 1–4.8)
LYMPHOCYTES NFR BLD: 36 % (ref 22–41)
MCH RBC QN AUTO: 29.3 PG (ref 27–33)
MCHC RBC AUTO-ENTMCNC: 33.7 G/DL (ref 32.3–36.5)
MCV RBC AUTO: 86.9 FL (ref 81.4–97.8)
MONOCYTES # BLD AUTO: 0.51 K/UL (ref 0–0.85)
MONOCYTES NFR BLD AUTO: 7.6 % (ref 0–13.4)
NEUTROPHILS # BLD AUTO: 3.46 K/UL (ref 1.82–7.42)
NEUTROPHILS NFR BLD: 51.8 % (ref 44–72)
NRBC # BLD AUTO: 0 K/UL
NRBC BLD-RTO: 0 /100 WBC (ref 0–0.2)
PLATELET # BLD AUTO: 185 K/UL (ref 164–446)
PMV BLD AUTO: 12 FL (ref 9–12.9)
POTASSIUM SERPL-SCNC: 3.9 MMOL/L (ref 3.6–5.5)
PROT SERPL-MCNC: 7.2 G/DL (ref 6–8.2)
RBC # BLD AUTO: 5.33 M/UL (ref 4.7–6.1)
SODIUM SERPL-SCNC: 141 MMOL/L (ref 135–145)
TRIGL SERPL-MCNC: 127 MG/DL (ref 0–149)
WBC # BLD AUTO: 6.7 K/UL (ref 4.8–10.8)

## 2023-07-24 PROCEDURE — 80053 COMPREHEN METABOLIC PANEL: CPT

## 2023-07-24 PROCEDURE — 36415 COLL VENOUS BLD VENIPUNCTURE: CPT

## 2023-07-24 PROCEDURE — 80061 LIPID PANEL: CPT

## 2023-07-24 PROCEDURE — 83036 HEMOGLOBIN GLYCOSYLATED A1C: CPT

## 2023-07-24 PROCEDURE — 85025 COMPLETE CBC W/AUTO DIFF WBC: CPT

## 2023-08-06 ENCOUNTER — HOSPITAL ENCOUNTER (OUTPATIENT)
Dept: RADIOLOGY | Facility: MEDICAL CENTER | Age: 53
End: 2023-08-06
Attending: STUDENT IN AN ORGANIZED HEALTH CARE EDUCATION/TRAINING PROGRAM
Payer: COMMERCIAL

## 2023-08-06 DIAGNOSIS — E04.1 NONTOXIC UNINODULAR GOITER: ICD-10-CM

## 2023-08-06 PROCEDURE — 76536 US EXAM OF HEAD AND NECK: CPT

## 2023-09-19 ENCOUNTER — TELEPHONE (OUTPATIENT)
Dept: HEALTH INFORMATION MANAGEMENT | Facility: OTHER | Age: 53
End: 2023-09-19
Payer: COMMERCIAL

## 2023-09-22 ENCOUNTER — HOSPITAL ENCOUNTER (OUTPATIENT)
Dept: CARDIOLOGY | Facility: MEDICAL CENTER | Age: 53
End: 2023-09-22
Attending: STUDENT IN AN ORGANIZED HEALTH CARE EDUCATION/TRAINING PROGRAM
Payer: COMMERCIAL

## 2023-09-22 DIAGNOSIS — F03.90 SENILE DEMENTIA, UNCOMPLICATED (HCC): ICD-10-CM

## 2023-09-22 LAB
LV EJECT FRACT  99904: 65
LV EJECT FRACT MOD 2C 99903: 57.63
LV EJECT FRACT MOD 4C 99902: 65.51
LV EJECT FRACT MOD BP 99901: 62.96

## 2023-09-22 PROCEDURE — 93306 TTE W/DOPPLER COMPLETE: CPT

## 2023-09-22 PROCEDURE — 93306 TTE W/DOPPLER COMPLETE: CPT | Mod: 26 | Performed by: INTERNAL MEDICINE

## 2023-10-02 ENCOUNTER — TELEPHONE (OUTPATIENT)
Dept: NEUROLOGY | Facility: MEDICAL CENTER | Age: 53
End: 2023-10-02
Payer: COMMERCIAL

## 2023-10-02 NOTE — TELEPHONE ENCOUNTER
Patients son called and would like to have the letter that was written on 05/11/23 to be updated as his father did go back to work but now 6 months later is having a real hard time on remembering his tasks.

## 2023-10-17 ENCOUNTER — HOSPITAL ENCOUNTER (OUTPATIENT)
Dept: LAB | Facility: MEDICAL CENTER | Age: 53
End: 2023-10-17
Attending: STUDENT IN AN ORGANIZED HEALTH CARE EDUCATION/TRAINING PROGRAM
Payer: COMMERCIAL

## 2023-10-17 LAB
ALBUMIN SERPL BCP-MCNC: 4.5 G/DL (ref 3.2–4.9)
ALBUMIN/GLOB SERPL: 1.7 G/DL
ALP SERPL-CCNC: 71 U/L (ref 30–99)
ALT SERPL-CCNC: 31 U/L (ref 2–50)
ANION GAP SERPL CALC-SCNC: 11 MMOL/L (ref 7–16)
AST SERPL-CCNC: 23 U/L (ref 12–45)
BASOPHILS # BLD AUTO: 0.8 % (ref 0–1.8)
BASOPHILS # BLD: 0.05 K/UL (ref 0–0.12)
BILIRUB SERPL-MCNC: 0.5 MG/DL (ref 0.1–1.5)
BUN SERPL-MCNC: 10 MG/DL (ref 8–22)
CALCIUM ALBUM COR SERPL-MCNC: 8.7 MG/DL (ref 8.5–10.5)
CALCIUM SERPL-MCNC: 9.1 MG/DL (ref 8.4–10.2)
CHLORIDE SERPL-SCNC: 104 MMOL/L (ref 96–112)
CHOLEST SERPL-MCNC: 111 MG/DL (ref 100–199)
CO2 SERPL-SCNC: 26 MMOL/L (ref 20–33)
CREAT SERPL-MCNC: 1.04 MG/DL (ref 0.5–1.4)
CREAT UR-MCNC: 337.1 MG/DL
EOSINOPHIL # BLD AUTO: 0.12 K/UL (ref 0–0.51)
EOSINOPHIL NFR BLD: 1.8 % (ref 0–6.9)
ERYTHROCYTE [DISTWIDTH] IN BLOOD BY AUTOMATED COUNT: 46.8 FL (ref 35.9–50)
EST. AVERAGE GLUCOSE BLD GHB EST-MCNC: 123 MG/DL
FASTING STATUS PATIENT QL REPORTED: NORMAL
GFR SERPLBLD CREATININE-BSD FMLA CKD-EPI: 85 ML/MIN/1.73 M 2
GLOBULIN SER CALC-MCNC: 2.6 G/DL (ref 1.9–3.5)
GLUCOSE SERPL-MCNC: 98 MG/DL (ref 65–99)
HBA1C MFR BLD: 5.9 % (ref 4–5.6)
HCT VFR BLD AUTO: 46.9 % (ref 42–52)
HDLC SERPL-MCNC: 49 MG/DL
HGB BLD-MCNC: 15.8 G/DL (ref 14–18)
IMM GRANULOCYTES # BLD AUTO: 0.02 K/UL (ref 0–0.11)
IMM GRANULOCYTES NFR BLD AUTO: 0.3 % (ref 0–0.9)
LDLC SERPL CALC-MCNC: 44 MG/DL
LYMPHOCYTES # BLD AUTO: 2.56 K/UL (ref 1–4.8)
LYMPHOCYTES NFR BLD: 39.2 % (ref 22–41)
MCH RBC QN AUTO: 29.2 PG (ref 27–33)
MCHC RBC AUTO-ENTMCNC: 33.7 G/DL (ref 32.3–36.5)
MCV RBC AUTO: 86.5 FL (ref 81.4–97.8)
MICROALBUMIN UR-MCNC: 1.3 MG/DL
MICROALBUMIN/CREAT UR: 4 MG/G (ref 0–30)
MONOCYTES # BLD AUTO: 0.47 K/UL (ref 0–0.85)
MONOCYTES NFR BLD AUTO: 7.2 % (ref 0–13.4)
NEUTROPHILS # BLD AUTO: 3.31 K/UL (ref 1.82–7.42)
NEUTROPHILS NFR BLD: 50.7 % (ref 44–72)
NRBC # BLD AUTO: 0 K/UL
NRBC BLD-RTO: 0 /100 WBC (ref 0–0.2)
PLATELET # BLD AUTO: 170 K/UL (ref 164–446)
PMV BLD AUTO: 11.5 FL (ref 9–12.9)
POTASSIUM SERPL-SCNC: 4.1 MMOL/L (ref 3.6–5.5)
PROT SERPL-MCNC: 7.1 G/DL (ref 6–8.2)
PSA SERPL-MCNC: 1.31 NG/ML (ref 0–4)
RBC # BLD AUTO: 5.42 M/UL (ref 4.7–6.1)
SODIUM SERPL-SCNC: 141 MMOL/L (ref 135–145)
TRIGL SERPL-MCNC: 88 MG/DL (ref 0–149)
WBC # BLD AUTO: 6.5 K/UL (ref 4.8–10.8)

## 2023-10-17 PROCEDURE — 36415 COLL VENOUS BLD VENIPUNCTURE: CPT

## 2023-10-17 PROCEDURE — 84153 ASSAY OF PSA TOTAL: CPT

## 2023-10-17 PROCEDURE — 82570 ASSAY OF URINE CREATININE: CPT

## 2023-10-17 PROCEDURE — 80061 LIPID PANEL: CPT

## 2023-10-17 PROCEDURE — 85025 COMPLETE CBC W/AUTO DIFF WBC: CPT

## 2023-10-17 PROCEDURE — 80053 COMPREHEN METABOLIC PANEL: CPT

## 2023-10-17 PROCEDURE — 83036 HEMOGLOBIN GLYCOSYLATED A1C: CPT

## 2023-10-17 PROCEDURE — 82043 UR ALBUMIN QUANTITATIVE: CPT

## 2023-11-20 ENCOUNTER — HOSPITAL ENCOUNTER (OUTPATIENT)
Dept: LAB | Facility: MEDICAL CENTER | Age: 53
End: 2023-11-20
Attending: PSYCHIATRY & NEUROLOGY
Payer: COMMERCIAL

## 2023-11-20 ENCOUNTER — OFFICE VISIT (OUTPATIENT)
Dept: NEUROLOGY | Facility: MEDICAL CENTER | Age: 53
End: 2023-11-20
Attending: PSYCHIATRY & NEUROLOGY
Payer: COMMERCIAL

## 2023-11-20 VITALS
HEIGHT: 67 IN | DIASTOLIC BLOOD PRESSURE: 80 MMHG | SYSTOLIC BLOOD PRESSURE: 112 MMHG | OXYGEN SATURATION: 99 % | WEIGHT: 177.91 LBS | BODY MASS INDEX: 27.92 KG/M2 | HEART RATE: 60 BPM

## 2023-11-20 DIAGNOSIS — F02.A0 MILD DEMENTIA ASSOCIATED WITH OTHER UNDERLYING DISEASE, WITHOUT BEHAVIORAL DISTURBANCE, PSYCHOTIC DISTURBANCE, MOOD DISTURBANCE, OR ANXIETY (HCC): ICD-10-CM

## 2023-11-20 LAB
25(OH)D3 SERPL-MCNC: 32 NG/ML (ref 30–100)
ANION GAP SERPL CALC-SCNC: 11 MMOL/L (ref 7–16)
BUN SERPL-MCNC: 12 MG/DL (ref 8–22)
CALCIUM SERPL-MCNC: 9.4 MG/DL (ref 8.5–10.5)
CHLORIDE SERPL-SCNC: 106 MMOL/L (ref 96–112)
CO2 SERPL-SCNC: 26 MMOL/L (ref 20–33)
CREAT SERPL-MCNC: 0.91 MG/DL (ref 0.5–1.4)
FOLATE SERPL-MCNC: 11.1 NG/ML
GFR SERPLBLD CREATININE-BSD FMLA CKD-EPI: 100 ML/MIN/1.73 M 2
GLUCOSE SERPL-MCNC: 96 MG/DL (ref 65–99)
POTASSIUM SERPL-SCNC: 4.4 MMOL/L (ref 3.6–5.5)
SODIUM SERPL-SCNC: 143 MMOL/L (ref 135–145)
TSH SERPL DL<=0.005 MIU/L-ACNC: 1.71 UIU/ML (ref 0.38–5.33)
VIT B12 SERPL-MCNC: 565 PG/ML (ref 211–911)

## 2023-11-20 PROCEDURE — 84443 ASSAY THYROID STIM HORMONE: CPT

## 2023-11-20 PROCEDURE — 82607 VITAMIN B-12: CPT

## 2023-11-20 PROCEDURE — 3079F DIAST BP 80-89 MM HG: CPT | Performed by: PSYCHIATRY & NEUROLOGY

## 2023-11-20 PROCEDURE — 80048 BASIC METABOLIC PNL TOTAL CA: CPT

## 2023-11-20 PROCEDURE — 99211 OFF/OP EST MAY X REQ PHY/QHP: CPT | Performed by: PSYCHIATRY & NEUROLOGY

## 2023-11-20 PROCEDURE — 36415 COLL VENOUS BLD VENIPUNCTURE: CPT

## 2023-11-20 PROCEDURE — 99215 OFFICE O/P EST HI 40 MIN: CPT | Performed by: PSYCHIATRY & NEUROLOGY

## 2023-11-20 PROCEDURE — 3074F SYST BP LT 130 MM HG: CPT | Performed by: PSYCHIATRY & NEUROLOGY

## 2023-11-20 PROCEDURE — 82306 VITAMIN D 25 HYDROXY: CPT

## 2023-11-20 PROCEDURE — 82746 ASSAY OF FOLIC ACID SERUM: CPT

## 2023-11-20 RX ORDER — ROSUVASTATIN CALCIUM 40 MG/1
40 TABLET, COATED ORAL
COMMUNITY
Start: 2023-09-26

## 2023-11-20 ASSESSMENT — FIBROSIS 4 INDEX: FIB4 SCORE: 1.31

## 2023-11-20 ASSESSMENT — PATIENT HEALTH QUESTIONNAIRE - PHQ9: CLINICAL INTERPRETATION OF PHQ2 SCORE: 0

## 2023-11-20 NOTE — PROGRESS NOTES
"Neurology Note:    Reason: Mild to Moderate stage Dementia (suspect Neurodegenerative type or form).    Noe Leon 53 y.o. right handed gentleman who is here with his wife and son.  He has a 9th grade education in [place].     He was  at the [employer] for many years (nearly 15)  and just took another position recently> .     He stopped working at the [employer] about 5 months ago. And completely stopped working in October 2023.    When asked about his memory and if it's changed and he identifies that in the last 6 months he recognizes that he has to think \"it 2 or 3 times\" such as talking about something to her wife and he can forget what he said.   There was difficulty in \"dealing with people\" (if someone interrupts him he can forget his train of thought easily).    Since his last visit with me in May 2023, his wife has noticed he has more difficulty turning the lights off and on. She has also noticed difficulty with replacing batteries in the remote control.  He will frequently repeat himself and asks the same question(s) over again within 10 minutes.    In terms of his communication ability, he has more difficulty explaining things in general.     Historically son noticed about 2 years ago that back at that time he was having difficulty using [employer] Apps and making him panicky and just sending emails. 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     He will frequently repeat himself and within 10 minutes he can repeat multiple times within 1 hour and this has been ongoing for over 9-12 months.     He is easily anxious when trying simple tasks such as drawing a clock and he could do a simple clock task.     He is having harder times fixing things (like a Doggie Door) and even dating back to his wife 3 years ago when wife was going to prepare a barbeque which Noe was going to do but he had completely forgotten that conversation.     He has " difficulty making a coffee and he will open lots of cabinets to location the different things he needs to make coffee.     Sometimes he can't send a text message 3 years ago per son and wife.     Speech-language seems preserved to son and wife.    No history of psychotic features or parkinsonism in the recent 6-12 months.  No dysarthria,dysphagia in the recent months.     Denies being or feeling depressed or wanting,planning or attempting to end life in recent months to 2-3 years.     No sundowning behaviors noticed per wife or son.     No sensory disturbances of feet,toes or hands in the recent months.     No seizure type episodes noted or admitted to in recent days to few years (focal or generalized type events).     Family Hx: no  history of cognitive,memory or jim dementia issues known in his blood pressure in 1st or 2nd degree relatives.       There are no problems to display for this patient.      Past medical history:   No past medical history on file.    Past surgical history:   No past surgical history on file.      Social history:   Social History     Socioeconomic History    Marital status:      Spouse name: Not on file    Number of children: Not on file    Years of education: Not on file    Highest education level: Not on file   Occupational History    Not on file   Tobacco Use    Smoking status: Never    Smokeless tobacco: Never   Vaping Use    Vaping Use: Never used   Substance and Sexual Activity    Alcohol use: Not Currently    Drug use: Never    Sexual activity: Not on file   Other Topics Concern    Not on file   Social History Narrative    Not on file     Social Determinants of Health     Financial Resource Strain: Not on file   Food Insecurity: Not on file   Transportation Needs: Not on file   Physical Activity: Not on file   Stress: Not on file   Social Connections: Not on file   Intimate Partner Violence: Not on file   Housing Stability: Not on file       Family history:   No family  "history on file.      Current medications:   Current Outpatient Medications   Medication    memantine (NAMENDA) 10 MG Tab    donepezil (ARICEPT) 10 MG tablet    donepezil (ARICEPT) 10 MG tablet    galantamine (REMINYL) 4 MG Tab    cyanocobalamin (VITAMIN B-12) 1000 MCG/ML Solution    metFORMIN (GLUCOPHAGE) 500 MG Tab    influenza vaccine quad (FLUZONE QUADRIVALENT) 0.5 ML Suspension Prefilled Syringe injection    oseltamivir (TAMIFLU) 75 MG Cap     No current facility-administered medications for this visit.       Medication Allergy:  No Known Allergies        Physical examination:   Vitals:    11/20/23 0803   BP: 112/80   BP Location: Right arm   Patient Position: Sitting   BP Cuff Size: Adult   Pulse: 60   TempSrc: Temporal   SpO2: 99%   Weight: 80.7 kg (177 lb 14.6 oz)   Height: 1.702 m (5' 7\")       Normal cephalic atraumatic.  There is full range of movement around the neck in all directions without restrictions or discrete pain evoked triggers.  No lower extremity edema.      Neurological  Exam:      [place] Cognitive Assessment (MOCA) Version 7.1    Years of Education: High School Graduate (in [place])- he stated \"I was a good student in High School.\"    TOTAL SCORE: 10/30  (to be scanned into the MEDIA section in the E.M.R.)    He could not tell me the months of the years when asked > it was hard to tell me the months of the year in sequence and got stuck when at \"July\".      He knows his phone number and stated it correctly.    He was able to tell me his address without difficulty.      Mental status: Awake, alert and fully oriented to person, place, time, and situation. Normal attention and concentration.  Did not appear/act combative,irritable,anxious,paranoid/delusional or aggressive to or with me.    Speech and language: Speech is fluent without errors, clear, intact to repetition, and intact to naming.     Follows 3 step motor commands in sequence without significant delay and correctly.    Cranial " nerve exam:  II: Pupils are equally round and reactive to light. Visual fields are intact by confrontation.  III, IV, VI: EOMI, no diplopia, no ptosis.  V: Sensation to light touch is normal over V1-3 distributions bilaterally.  .  VII: Facial movements are symmetrical. There is no facial droop. .  VIII: Hearing intact to soft speech and finger rub bilaterally  IX: Palate elevates symmetrically, uvula is midline. Dysarthria is not present.  XI: Shoulder shrug are symmetrical and strong.   XII: Tongue protrudes midline.      Motor exam:  Muscle tone is normal in all 4 limbs. and No abnormal movements appreciated.    Muscle strength:    Neck Flexors/Extensors: 5/5       Right  Left  Deltoid   5/5  5/5      Biceps   5/5  5/5  Triceps              5/5  5/5   Wrist extensors 5/5  5/5  Wrist flexors  5/5  5/5     5/5  5/5  Interossei  5/5  5/5  Thenar (APB)  5/5  5/5   Hip flexors  5/5  5/5  Quadriceps  5/5  5/5    Hamstrings  5/5  5/5  Dorsiflexors  5/5  5/5  Plantarflexors  5/5  5/5  Toe extension  5/5  5/5      Reflexes:       Right  Left  Biceps   2/4  2/4  Triceps              2/4  2/4  Brachioradialis 2/4  2/4  Knee jerk  2/4  2/4  Ankle jerk  2/4  2/4     Frontal release signs are absent    bilaterally toes are downgoing to plantar stimulation..    Coordination (finger-to-nose, heel/knee/shin, rapid alternating movements) was normal.     There was no ataxia, no tremors, and no dysmetria.     Station and gait >> Easily stands up from exam chair without retropulsion,veering,leaning,swaying (to either side).       Labs and Tests:    VITAMIN B12  Order: [id number] - Reflex for Order [id number]  Status: Final result       Visible to patient: Yes (seen)       Next appt: None    0 Result Notes       Component  Ref Range & Units 6 mo ago 1 yr ago   Vitamin B12 -True Cobalamin  211 - 911 pg/mL 474 [id number]           NEUROIMAGING:   Narrative & Impression     12/7/2022 3:30 PM     HISTORY/REASON FOR EXAM:  Memory loss.         TECHNIQUE/EXAM DESCRIPTION:   MRI of the brain without and with contrast.     Multiplanar multisequence MR examination of the brain with and without contrast. 20 mL ProHance contrast was administered intravenously.     COMPARISON:  None.     FINDINGS: Mild diffuse cerebral volume loss is seen.  There is no hippocampal volume loss or signal change.     A few nonspecific T2 hyperintensities in the subcortical and periventricular white matter. There is no intra-axial space-occupying lesion. There is no extra-axial fluid collection, hemorrhage or mass. There is no evidence of hydrocephalus. The visualized   flow voids of the cerebral vasculature are unremarkable. There is a developmental venous anomaly in the right cerebellum.  There is no large lesion identified in the expected course of the intracranial portions of the cranial nerves.     The skull bones are unremarkable. The paranasal sinuses are clear. The extracranial soft tissue including orbits appear grossly normal.        IMPRESSION:     1.  In view of history of memory loss, there is no MR evidence of normal pressure hydrocephalus, severe chronic microvascular ischemic disease or any specific neuro degenerative pattern of volume loss.  2.  Mild diffuse volume loss.  3.  A few punctate nonspecific T2 hyperintensities in the supratentorial parenchyma likely representing nonspecific foci of gliosis.           Exam Ended: 12/07/22  4:49 PM Last Resulted: 12/08/22  5:45 AM                 Impression/Plans/Recommendations:    Mild to Moderate Stage of Dementia (probable and  likelyAlzheimer's Disease) given type of symptoms described by family and very slow evolution.      There are clearly issues with short term memory, language ability and executive functioning today on the MOCA and by speaking with the wife today.     MOCA score today of 10 /30.    Very poor short term memory, difficulty with word fluency, poor naming, poor visual spatial and executive  "functioning on MOCA.     Functional Activity Questionnaire score today between 18  per wife today.    Global Deterioration score today in the 5 range per wife.    Having more and more problems at  home doing simple tasks such as making coffee, sending texts or emails.       Today, I reviewed the clinical criteria and reviewed several  scenarios of the differences being using the medical terms of normal brain aging (age associated memory impairment),  Mild Cognitive Impairment (MCI) and Dementia.    Dementia  is a syndrome but statistically and for the majority of patients  occurs due  to a more rapid aging of the brain tissue or potentially from injury to certain parts of one's brain ( often from such contributing factors as  the cumulative effects of alcohol, from one or more ischemic or hemmorhagic stroke(s), from neurodegeneration or long standing with/without suboptimally controlled Hypertension). It is for some of these potential factors as to why I recommend a brain imaging test (Head CT or Brain MRI) be done for the 1st time or in certain circumstances repeated for comparison purposes  as such imaging can suggest one or more factors as to the reason(s) for the person's cognitive/memory changes. In fact, a normal or \"age related\" finding on a brain imaging test in and of itself is useful clinical and objective information to have in the evaluation of a person who has either an acute, evolving or even chronic (months to years) long cognitive/memory complaint.     Additional factors or contributors to Brain Health issues can be summarized in several books/references which I discussed as well today.      Goals going forwards include:     A. Paying attention to one's risk factors and reducing their prevalence or potential impact on one's changing memory/thinking> an excellent example would be to stop smoking, reduce or eliminate alcohol use (depending on the amount and frequency of usage), maintain good blood " pressure control by buying a digital arm blood pressure cuff such as an OMRON Series 3 or 5 and checking one's blood pressure atleast 3 days per week (in the am and early afternoon) that the numbers are under 140/90 and working as needed with the primary care doctor  to optimize blood pressure control).     B. Encouraging proper sleep hygiene which for most adults is 7 to 8 hours of uninterrupted sleep per night.       C. Encouraging some form of exercise preferable aerobic forms to be done (4 to 5 days per week- 30 minutes minimum per day)> 150 minutes per week as a goal. Example activities could include fast walking (up a slight incline), jogging, cycling (road or stationary), swimming,tennis. Essentially, even basic walking on a flat surface or a treadmill would be better than doing nothing.     D. Maintaining or forming new social contacts with family and friends  and a positive attitude despite the concerns and/or ongoing issues with thinking and/or memory.     E. Eating well which means a diet low in salt  (under 2 grams per day), sugar and saturated fat.     F. Maintaining one's BMI (Body Mass Index) under 30.     G. Consideration of the use of cognitive enhancers (acetylcholinerase inhibitors such as Aricept vs an NMDA Receptor agent like Namenda). Pros and cons of such compounds in terms of predicted efficacy and side effects profiles reviewed.  He will remain on Aricept 10 mg a day and stop Galantamine since both drugs works the same.    He has been on Aricept for 6 months or so.  Side effects reviewed with him,son and wife and we also discussed consideration in future of adding or using Memantine (hand outs given to son about this medication).    Will stay on 10 mg Aricept and 10 mg PO BID (20 mg a day of Nameda) at this time.     Ana would like to do a  Brain PET Scan- > reviewed consideration with family of doing this test which I feel will very likely be abnormal.     I. I gave the wife and  Noe some additional information about the IV Amyloid medications including LEQEMBI and answered their questions about these medications and specifically reviewed the risks vs benefits. At this time he does not want to take on the risks of using any of the IV anti amyloid medications at this point.    J. Encouraged attention to diet (low sugar content) and exercising.    K. Recheck lab work (Vitamin B and D levels)     I have performed  a history and physical exam and a directed /focused  ROS today.    Total time spent today or this patient's care was 50 minutes  and included reviewing  the diagnostic workup to date (such as labs and imaging as well as interpreting such tests relevant to this patient's neurological condition),  reviewing/obtaining separately obtained history (from patient and/or accompanying wife)for today's neurological problem(s) ,counseling and educating the patient and family member on issues related to cognition/memory and cognitive health factors and documenting  the clinical information in the EMR.    Follow up in 6 months or so.        Ayush Dodson MD  [facility].   [facility]

## 2023-12-13 ENCOUNTER — PATIENT MESSAGE (OUTPATIENT)
Dept: NEUROLOGY | Facility: MEDICAL CENTER | Age: 53
End: 2023-12-13
Payer: COMMERCIAL

## 2023-12-13 DIAGNOSIS — F40.240 CLAUSTROPHOBIA: ICD-10-CM

## 2023-12-14 RX ORDER — DIAZEPAM 5 MG/1
TABLET ORAL
Qty: 1 TABLET | Refills: 1 | Status: SHIPPED | OUTPATIENT
Start: 2023-12-14 | End: 2023-12-16

## 2023-12-14 NOTE — PATIENT COMMUNICATION
Please see below       Hi doctor, this is Noe wife, he is going to have a PET CT scan, but he gets very nervous and he has claustrophobia he’s going to need a pill like when they did the MRI. Can you please prescribe a pill for his appointment. Thank you

## 2023-12-19 ENCOUNTER — HOSPITAL ENCOUNTER (OUTPATIENT)
Dept: RADIOLOGY | Facility: MEDICAL CENTER | Age: 53
End: 2023-12-19
Attending: PSYCHIATRY & NEUROLOGY
Payer: COMMERCIAL

## 2023-12-19 DIAGNOSIS — F02.A0 MILD DEMENTIA ASSOCIATED WITH OTHER UNDERLYING DISEASE, WITHOUT BEHAVIORAL DISTURBANCE, PSYCHOTIC DISTURBANCE, MOOD DISTURBANCE, OR ANXIETY (HCC): ICD-10-CM

## 2023-12-19 PROCEDURE — A9552 F18 FDG: HCPCS

## 2024-05-20 ENCOUNTER — OFFICE VISIT (OUTPATIENT)
Dept: NEUROLOGY | Facility: MEDICAL CENTER | Age: 54
End: 2024-05-20
Attending: PSYCHIATRY & NEUROLOGY
Payer: MEDICAID

## 2024-05-20 VITALS
WEIGHT: 173.5 LBS | TEMPERATURE: 97.7 F | HEART RATE: 66 BPM | DIASTOLIC BLOOD PRESSURE: 68 MMHG | BODY MASS INDEX: 27.23 KG/M2 | SYSTOLIC BLOOD PRESSURE: 108 MMHG | RESPIRATION RATE: 18 BRPM | HEIGHT: 67 IN | OXYGEN SATURATION: 97 %

## 2024-05-20 DIAGNOSIS — F02.A4 MILD EARLY ONSET ALZHEIMER'S DEMENTIA WITH ANXIETY (HCC): ICD-10-CM

## 2024-05-20 DIAGNOSIS — G30.0 MILD EARLY ONSET ALZHEIMER'S DEMENTIA WITH ANXIETY (HCC): ICD-10-CM

## 2024-05-20 PROCEDURE — 3074F SYST BP LT 130 MM HG: CPT | Performed by: PSYCHIATRY & NEUROLOGY

## 2024-05-20 PROCEDURE — 99214 OFFICE O/P EST MOD 30 MIN: CPT | Performed by: PSYCHIATRY & NEUROLOGY

## 2024-05-20 PROCEDURE — 3078F DIAST BP <80 MM HG: CPT | Performed by: PSYCHIATRY & NEUROLOGY

## 2024-05-20 ASSESSMENT — MONTREAL COGNITIVE ASSESSMENT (MOCA)
4. NAME EACH OF THE THREE ANIMALS SHOWN: 3/3
ORIENTATION SUBSCORE: 4/6
8. SERIAL SUBTRACTION OF 7S: 0/5
WHAT IS THE VERSION OF MOCA ADMINISTERED: 7.3
3. DRAW A CLOCK: CONTOUR, NUMBERS, HANDS: 1/3
DELAYED RECALL SUBSCORE: 1/5
WHAT IS THE TOTAL SCORE (OUT OF 30): 12
6. READ LIST OF DIGITS [FORWARD/BACKWARD]: 1/2
7. [VIGILENCE] TAP WHEN HEARING DESIGNATED LETTER: 1/1
2. COPY DRAWING: 0/1
10. [FLUENCY] NAME WORDS STARTING WITH DESIGNATED LETTER: 0/1
9. REPEAT EACH SENTENCE: 0/2
11. FOR EACH PAIR OF WORDS, WHAT CATEGORY DO THEY BELONG TO (OUT OF 2): 1/2
1. ALTERNATING TRAIL MAKING: 0/1
CATEGORY CUE (IF APPLICABLE): 3
5. MEMORY TRIALS: SECOND TRIAL

## 2024-05-20 ASSESSMENT — FIBROSIS 4 INDEX: FIB4 SCORE: 1.34

## 2024-05-20 NOTE — PROGRESS NOTES
"Neurology Note:    Last seen by me on 11/20/2023      Reason: Mild to Moderate stage Dementia (suspect Neurodegenerative type or form).     Noe Leon 54 y.o. right handed gentleman who is here with his wife and son.  He has a 9th grade education in [place].    Problem List reviewed:  Namenda 10 mg PO BID and Aricept 10 mg a day.       He was  at the [employer] for many years (nearly 15)  and just took another position recently> .     He stopped working at the [employer] about 5 months ago. And completely stopped working in October 2023.     When asked about his memory and if it's changed and he identifies that in the last 12 months he recognizes that he has to think \"it 2 or 3 times\" such as talking about something to her wife and he can forget what he said.   There was difficulty in \"dealing with people\" (if someone interrupts him he can forget his train of thought easily).     Since his last visit with me in 11/2023  his wife has noticed he has more difficulty turning the lights off and on. She has also noticed difficulty with replacing batteries in the remote control.  He will frequently repeat himself and asks the same question(s) over again within 10 minutes.       In terms of his communication ability, he has more difficulty explaining things in general.     Historically son noticed about 2 years ago that back at that time he was having difficulty using [employer] Apps and making him panicky and just sending emails. 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     He will frequently repeat himself and within 10 minutes he can repeat multiple times within 1 hour and this has been ongoing for over 12-24 months. Sometimes  his wife will tell Noe \"you have told me that 3 times already\" (within this 10 minute period).     He is easily anxious when trying simple tasks such as drawing a clock and he could do a simple clock task.     He is having " harder times fixing things (like a Doggie Door) and even dating back to his wife 3 years ago when wife was going to prepare a barbeque which Noe was going to do but he had completely forgotten that conversation.     He has difficulty making a coffee and he will open lots of cabinets to location the different things he needs to make coffee.     Sometimes he can't send a text message 3 years ago per son and wife.     Speech-language seems preserved to son and wife.    No history of psychotic features or parkinsonism in the recent 6-12 months.  No dysarthria,dysphagia in the recent months.     Denies being or feeling depressed or wanting,planning or attempting to end life in recent months to 2-3 years.     No sundowning behaviors noticed per wife or son.     No sensory disturbances of feet,toes or hands in the recent months.     No seizure type episodes noted or admitted to in recent days to few years (focal or generalized type events).     Family Hx: no  history of cognitive,memory or jim dementia issues known in his blood pressure in 1st or 2nd degree relatives.       There are no problems to display for this patient.    There are no problems to display for this patient.      Past medical history:   No past medical history on file.    Past surgical history:   No past surgical history on file.      Social history:   Social History     Socioeconomic History    Marital status:      Spouse name: Not on file    Number of children: Not on file    Years of education: Not on file    Highest education level: Not on file   Occupational History    Not on file   Tobacco Use    Smoking status: Never    Smokeless tobacco: Never   Vaping Use    Vaping status: Never Used   Substance and Sexual Activity    Alcohol use: Not Currently    Drug use: Never    Sexual activity: Not on file   Other Topics Concern    Not on file   Social History Narrative    Not on file     Social Determinants of Health     Financial Resource  "Strain: Not on file   Food Insecurity: Not on file   Transportation Needs: Not on file   Physical Activity: Not on file   Stress: Not on file   Social Connections: Not on file   Intimate Partner Violence: Not on file   Housing Stability: Not on file       Family history:   No family history on file.      Current medications:   Current Outpatient Medications   Medication    memantine (NAMENDA) 10 MG Tab    donepezil (ARICEPT) 10 MG tablet    cyanocobalamin (VITAMIN B-12) 1000 MCG/ML Solution    rosuvastatin (CRESTOR) 40 MG tablet    metFORMIN (GLUCOPHAGE) 500 MG Tab    influenza vaccine quad (FLUZONE QUADRIVALENT) 0.5 ML Suspension Prefilled Syringe injection    oseltamivir (TAMIFLU) 75 MG Cap     No current facility-administered medications for this visit.       Medication Allergy:  No Known Allergies        Physical examination:   Vitals:    24 0810   BP: 108/68   BP Location: Left arm   Patient Position: Sitting   BP Cuff Size: Adult   Pulse: 66   Resp: 18   Temp: 36.5 °C (97.7 °F)   TempSrc: Temporal   SpO2: 97%   Weight: 78.7 kg (173 lb 8 oz)   Height: 1.702 m (5' 7\")       Normal cephalic atraumatic.  There is full range of movement around the neck in all directions without restrictions or discrete pain evoked triggers.  No lower extremity edema.      Neurological  Exam:      [place] Cognitive Assessment (MOCA) Version 7.1    Years of Education: High School Graduate in [place]    TOTAL SCORE: 12/30  (to be scanned into the MEDIA section in the [facility]    He was able to tell me his address when asked.  He had slightly difficulty telling me his age but got this within 10 seconds of thinking about it.    4 quarters in 1 dollar correctly stated.  12 quarters in 3 dollar correctly stated.    2 dimes ,1 nickel, 1 primo>  17 cents.    [facility] Cognitive Assessment (MOCA) Version Number: 7.3   VISUOSPACIAL / EXECUTIVE   Clock Drawin/3 (numbers nor correctly placed; unable to place hands on time)  Spatial " "Drawin/1 (trail task was very difficult for him)  Cube Drawin/1 (cylinder not copied correctly (all lines not connected))    NAMING  Naming: 3/3    MEMORY  Memory: Second trial    ATTENTION  Digits: 1/2 (could not get \"471\" correct)  Letters: 1/  Subtraction: 0/5 (this was very difficult for him)    LANGUAGE  Repeat Phrases: 0/2 (both sentences were difficult to repeat)  Fluency: 0/1 (got 2 words that begin with a \"B\" in 1 minute)    ABSTRACTION  Abstraction: 1/2 (eye vs ear question was very hard for him)    DELAYED RECALL  Recall words: 1/5 (got train correct)  Category Cue (if applicable):    Multiple Choice Cue (if applicable):3 (got hat and chair but not the word Blue)    ORIENTATION  Orientation:  (thought it was \"\" and had no idea the date)    Add 1 point if less than or equal to 12 yr education level:     MOCA TOTAL SCORE:    Biomechanical/Visual Limitations (if applicable):            Mental status: Awake, alert and fully oriented to person, place, and situation. (Did not know date, \"\") Normal attention and concentration.  Did not appear/act combative,irritable,anxious,paranoid/delusional or aggressive to or with me.    Speech and language: Speech is with reduced word fluency. No paraphasic errors or neologisms.      Follows 3 step motor commands in sequence without significant delay and correctly.    Cranial nerve exam:  II: Pupils are equally round and reactive to light. Visual fields are intact by confrontation.  III, IV, VI: EOMI, no diplopia, no ptosis.  V: Sensation to light touch is normal over V1-3 distributions bilaterally.  .  VII: Facial movements are symmetrical. There is no facial droop. .  VIII: Hearing intact to soft speech and finger rub bilaterally  IX: Palate elevates symmetrically, uvula is midline. Dysarthria is not present.  XI: Shoulder shrug are symmetrical and strong.   XII: Tongue protrudes midline.      Motor exam:  Muscle tone is normal in all 4 limbs. " and No abnormal movements appreciated.    Muscle strength:    Neck Flexors/Extensors: 5/5       Right  Left  Deltoid   5/5  5/5      Biceps   5/5  5/5  Triceps               5/5  5/5   Wrist extensors 5/5  5/5  Wrist flexors  5/5  5/5     5/5  5/5  Interossei  5/5  5/5  Thenar (APB)  5/5  5/5   Hip flexors  5/5  5/5  Quadriceps  5/5  5/5    Hamstrings  5/5  5/5  Dorsiflexors  5/5  5/5  Plantarflexors  5/5  5/5  Toe extension  5/5  5/5      Reflexes:       Right  Left  Biceps   2/4  2/4  Triceps              2/4  2/4  Brachioradialis 2/4  2/4  Knee jerk  2/4  2/4  Ankle jerk  2/4  2/4     Frontal release signs are absent    bilaterally toes are downgoing to plantar stimulation..    Coordination (finger-to-nose, heel/knee/shin, rapid alternating movements) was normal.     There was no ataxia, no tremors, and no dysmetria.     Station and gait were normal. Easily stands up from exam chair without retropulsion,veering,leaning,swaying (to either side).       CT-PET METABOLIC EVALUATION - BRAIN  Order: [id number]   Status: Final result       Visible to patient: Yes (seen)       Next appt: None       Dx: Mild dementia associated with other u...    0 Result Notes       1 Patient Communication  Details    Reading Physician Reading Date Result Priority   Malcolm Thompson M.D.  [phone] 12/19/2023      Narrative & Impression     12/19/2023 7:28 AM     HISTORY/REASON FOR EXAM:  mild stage or degree of dementia- suspect Alzheimers  Memory loss.     TECHNIQUE/EXAM DESCRIPTION AND NUMBER OF VIEWS:  PET CT Metabolic evaluation of the brain.     Initially, 10.7 mCi F-18 FDG was administered intravenously under standardized conditions. Approximately 30 minutes after FDG administration, the patient was placed in the supine position on the PET CT table for brain imaging. CT images, PET images and   fused CT/PET images were reviewed on a computer workstation. Blood glucose level was 101 mg/dL. The low dose unenhanced CT images  were used for attenuation correction and anatomic correlation only.        COMPARISON: None.     FINDINGS:     There is decreased activity in the parietal and temporal lobes as well as the posterior cingulate gyrus,  in a pattern most consistent with Alzheimer disease. The occipital lobes and subcortical structures are spared. There is also decreased activity in   the right cerebellum.  Brain volume: Brain volume is normal for age.  Additional findings: None. .     IMPRESSION:        1. Regional hypometabolism most consistent with Alzheimer disease.  2. Decreased activity in the right cerebellum is also noted.           Exam Ended: 12/19/23  8:12 AM Last Resulted: 12/19/23  9:30 AM               1 Patient Communication        Component  Ref Range & Units 6 mo ago 1 yr ago 3 yr ago   25-Hydroxy   Vitamin D 25  30 - 100 ng/mL 32 28 Low  CM 39 R, CM   Comment: Adult Ranges:   <20 ng/mL - Deficiency  20-29 ng/mL - Insufficiency   ng/mL - Sufficiency  Electrochemiluminescence binding assay performed using Roche chadd e  immunoassay analyzer.  The Elecsys Vitamin D total II assay is intended for  the quantitative determination of total 25 hydroxyvitamin D in human serum  and plasma. This assay is to be used as an aid in the assessment of vitamin  D sufficiency in adults.           LATE  Order: [id number] - Reflex for Order [id number]   Status: Final result       Visible to patient: Yes (seen)       Next appt: None    0 Result Notes       1 Patient Communication       Component  Ref Range & Units 6 mo ago 1 yr ago   Folate -Folic Acid  >4.0 ng/mL 11.1 11.3   Resulting Agency [facility]                         Visible to patient: Yes (seen)       Next appt: None    0 Result Notes       1 Patient Communication        Component  Ref Range & Units 6 mo ago  (11/20/23) 1 yr ago  (4/25/23) 1 yr ago  (8/8/22)   Vitamin B12 -True Cobalamin  211 - 911 pg/mL [phone]            Mild to Moderate Stage of Dementia (probable and   "likelyAlzheimer's Disease) given type of symptoms described by family and very slow evolution.        There are clearly issues with short term memory, language ability and executive functioning today on the MOCA and by speaking with the wife today.     MOCA score today of 12 /30.     Noe has  poor short term memory, difficulty with word fluency, poor naming, poor visual spatial and executive functioning on MOCA.     Functional Activity Questionnaire score today of 21   per wife today.    Alzheimer's Questionnaire score today per wife of 17 (see media section)     Having more and more problems at  home doing simple tasks such as making coffee, sending texts or emails.       Today, I reviewed the clinical criteria and reviewed several  scenarios of the differences being using the medical terms of normal brain aging (age associated memory impairment),  Mild Cognitive Impairment (MCI) and Dementia.    Dementia  is a syndrome but statistically and for the majority of patients  occurs due  to a more rapid aging of the brain tissue or potentially from injury to certain parts of one's brain ( often from such contributing factors as  the cumulative effects of alcohol, from one or more ischemic or hemmorhagic stroke(s), from neurodegeneration or long standing with/without suboptimally controlled Hypertension). It is for some of these potential factors as to why I recommend a brain imaging test (Head CT or Brain MRI) be done for the 1st time or in certain circumstances repeated for comparison purposes  as such imaging can suggest one or more factors as to the reason(s) for the person's cognitive/memory changes. In fact, a normal or \"age related\" finding on a brain imaging test in and of itself is useful clinical and objective information to have in the evaluation of a person who has either an acute, evolving or even chronic (months to years) long cognitive/memory complaint.     Additional factors or contributors to Brain " Health issues can be summarized in several books/references which I discussed as well today.      Goals going forwards include:     A. Paying attention to one's risk factors and reducing their prevalence or potential impact on one's changing memory/thinking> an excellent example would be to stop smoking, reduce or eliminate alcohol use (depending on the amount and frequency of usage), maintain good blood pressure control by buying a digital arm blood pressure cuff such as an OMRON Series 3 or 5 and checking one's blood pressure atleast 3 days per week (in the am and early afternoon) that the numbers are under 140/90 and working as needed with the primary care doctor  to optimize blood pressure control).     B. Encouraging proper sleep hygiene which for most adults is 7 to 8 hours of uninterrupted sleep per night.       C. Encouraging some form of exercise preferable aerobic forms to be done (4 to 5 days per week- 30 minutes minimum per day)> 150 minutes per week as a goal. Example activities could include fast walking (up a slight incline), jogging, cycling (road or stationary), swimming,tennis. Essentially, even basic walking on a flat surface or a treadmill would be better than doing nothing.     D. Maintaining or forming new social contacts with family and friends  and a positive attitude despite the concerns and/or ongoing issues with thinking and/or memory.     E. Eating well which means a diet low in salt  (under 2 grams per day), sugar and saturated fat.     F. Maintaining one's BMI (Body Mass Index) under 30.     G. Consideration of the use of cognitive enhancers (acetylcholinerase inhibitors such as Aricept vs an NMDA Receptor agent like Namenda). Pros and cons of such compounds in terms of predicted efficacy and side effects profiles reviewed.  He will remain on Aricept 10 mg a day and stop Galantamine since both drugs works the same.    He has been on Aricept for 6 months or so.  Side effects reviewed  with him,son and wife and we also discussed consideration in future of adding or using Memantine (hand outs given to son about this medication).     Will stay on 10 mg Aricept and 10 mg PO BID (20 mg a day of Nameda) at this time.     H.Noe would like to do a  Brain PET Scan- > reviewed consideration with family of doing this test which I feel will very likely be abnormal.     I. I re-reviewed with his  wife and Noe some additional information about the IV Amyloid medications including LEQEMBI and answered their questions about these medications and specifically reviewed the risks vs benefits.     At this time he does not want to take on the risks of using any of the IV anti amyloid medications at this point.     J. Encouraged attention to diet (low sugar content) and exercising.     K. He is taking Vitamin D3 for supplementation.    L. Caregiver issues reviewed with wife.    Time spent today was 36 minutes reviewing above issues and taking additional history.    Follow up in about 6 months or so.

## 2024-05-23 DIAGNOSIS — F02.B0 MODERATE EARLY ONSET ALZHEIMER'S DEMENTIA WITHOUT BEHAVIORAL DISTURBANCE, PSYCHOTIC DISTURBANCE, MOOD DISTURBANCE, OR ANXIETY (HCC): ICD-10-CM

## 2024-05-23 DIAGNOSIS — G30.0 MODERATE EARLY ONSET ALZHEIMER'S DEMENTIA WITHOUT BEHAVIORAL DISTURBANCE, PSYCHOTIC DISTURBANCE, MOOD DISTURBANCE, OR ANXIETY (HCC): ICD-10-CM

## 2024-05-23 RX ORDER — MEMANTINE HYDROCHLORIDE 10 MG/1
TABLET ORAL
Qty: 120 TABLET | Refills: 6 | Status: SHIPPED | OUTPATIENT
Start: 2024-05-23 | End: 2025-05-24

## 2024-05-23 NOTE — TELEPHONE ENCOUNTER
Received request via: Patient    Medication Name/Dosage memantine (NAMENDA) 10 MG Tab     When was medication last prescribed 06/01/23    How many refills were previously provided 6    How many Refills does he patient have left from last prescription 0    Was the patient seen in the last year in this department? Yes   Date of last office visit 05/20/24     Per last Neurology Office Visit, when was the date of next follow up visit set for?                            Date of office visit follow up request 6 months      Does the patient have an upcoming appointment? Yes   If yes, when 11/26/24             If no, schedule appointment N/A     Does the patient have FDC Plus and need 100 day supply (blood pressure, diabetes and cholesterol meds only)? Patient does not have SCP

## 2024-06-12 DIAGNOSIS — Z76.0 MEDICATION REFILL: ICD-10-CM

## 2024-06-13 RX ORDER — DONEPEZIL HYDROCHLORIDE 10 MG/1
TABLET, FILM COATED ORAL
Qty: 30 TABLET | Refills: 12 | Status: SHIPPED | OUTPATIENT
Start: 2024-06-13 | End: 2025-09-01

## 2024-06-13 NOTE — TELEPHONE ENCOUNTER
Received request via: Pharmacy    Medication Name/Dosage donepezil (ARICEPT) 10 MG tablet     When was medication last prescribed 03/20/23    How many refills were previously provided 11    How many Refills does he patient have left from last prescription 0    Was the patient seen in the last year in this department? Yes   Date of last office visit 05/20/24       Per last Neurology Office Visit, when was the date of next follow up visit set for?                            Date of office visit follow up request 6 months      Does the patient have an upcoming appointment? Yes   If yes, when 11/26/24               If no, schedule appointment N/A     Does the patient have alf Plus and need 100 day supply (blood pressure, diabetes and cholesterol meds only)? Patient does not have SCP

## 2024-11-26 ENCOUNTER — OFFICE VISIT (OUTPATIENT)
Dept: NEUROLOGY | Facility: MEDICAL CENTER | Age: 54
End: 2024-11-26
Attending: PSYCHIATRY & NEUROLOGY
Payer: MEDICAID

## 2024-11-26 VITALS
RESPIRATION RATE: 18 BRPM | SYSTOLIC BLOOD PRESSURE: 118 MMHG | HEIGHT: 67 IN | DIASTOLIC BLOOD PRESSURE: 70 MMHG | TEMPERATURE: 97.8 F | HEART RATE: 65 BPM | BODY MASS INDEX: 27.13 KG/M2 | OXYGEN SATURATION: 97 % | WEIGHT: 172.84 LBS

## 2024-11-26 DIAGNOSIS — G30.0 MILD EARLY ONSET ALZHEIMER'S DEMENTIA WITH ANXIETY (HCC): Primary | ICD-10-CM

## 2024-11-26 DIAGNOSIS — F02.A4 MILD EARLY ONSET ALZHEIMER'S DEMENTIA WITH ANXIETY (HCC): Primary | ICD-10-CM

## 2024-11-26 PROCEDURE — 99211 OFF/OP EST MAY X REQ PHY/QHP: CPT | Performed by: PSYCHIATRY & NEUROLOGY

## 2024-11-26 ASSESSMENT — PATIENT HEALTH QUESTIONNAIRE - PHQ9: CLINICAL INTERPRETATION OF PHQ2 SCORE: 0

## 2024-11-26 ASSESSMENT — FIBROSIS 4 INDEX: FIB4 SCORE: 1.34

## 2024-11-26 NOTE — Clinical Note
Abiola  Noe was last patient and he left after the PARS left for the day.  Can you make sure he has a follow up with me in about 9 months from now (and his son also should be called for that appointment)?  Thank you.  James

## 2025-04-04 ENCOUNTER — APPOINTMENT (OUTPATIENT)
Dept: RADIOLOGY | Facility: MEDICAL CENTER | Age: 55
End: 2025-04-04
Attending: STUDENT IN AN ORGANIZED HEALTH CARE EDUCATION/TRAINING PROGRAM
Payer: MEDICAID

## 2025-04-04 ENCOUNTER — HOSPITAL ENCOUNTER (EMERGENCY)
Facility: MEDICAL CENTER | Age: 55
End: 2025-04-04
Attending: STUDENT IN AN ORGANIZED HEALTH CARE EDUCATION/TRAINING PROGRAM
Payer: MEDICAID

## 2025-04-04 VITALS
HEIGHT: 67 IN | HEART RATE: 72 BPM | OXYGEN SATURATION: 96 % | WEIGHT: 180.12 LBS | RESPIRATION RATE: 18 BRPM | BODY MASS INDEX: 28.27 KG/M2 | SYSTOLIC BLOOD PRESSURE: 95 MMHG | TEMPERATURE: 98.1 F | DIASTOLIC BLOOD PRESSURE: 45 MMHG

## 2025-04-04 DIAGNOSIS — R51.9 ACUTE NONINTRACTABLE HEADACHE, UNSPECIFIED HEADACHE TYPE: ICD-10-CM

## 2025-04-04 DIAGNOSIS — R07.9 CHEST PAIN, UNSPECIFIED TYPE: ICD-10-CM

## 2025-04-04 LAB
ALBUMIN SERPL BCP-MCNC: 4.3 G/DL (ref 3.2–4.9)
ALBUMIN/GLOB SERPL: 1.5 G/DL
ALP SERPL-CCNC: 73 U/L (ref 30–99)
ALT SERPL-CCNC: 25 U/L (ref 2–50)
ANION GAP SERPL CALC-SCNC: 13 MMOL/L (ref 7–16)
APPEARANCE UR: CLEAR
AST SERPL-CCNC: 26 U/L (ref 12–45)
BASOPHILS # BLD AUTO: 0.6 % (ref 0–1.8)
BASOPHILS # BLD: 0.05 K/UL (ref 0–0.12)
BILIRUB SERPL-MCNC: 0.3 MG/DL (ref 0.1–1.5)
BILIRUB UR QL STRIP.AUTO: NEGATIVE
BUN SERPL-MCNC: 19 MG/DL (ref 8–22)
CALCIUM ALBUM COR SERPL-MCNC: 9 MG/DL (ref 8.5–10.5)
CALCIUM SERPL-MCNC: 9.2 MG/DL (ref 8.5–10.5)
CHLORIDE SERPL-SCNC: 104 MMOL/L (ref 96–112)
CO2 SERPL-SCNC: 22 MMOL/L (ref 20–33)
COLOR UR: YELLOW
CREAT SERPL-MCNC: 0.93 MG/DL (ref 0.5–1.4)
EKG IMPRESSION: NORMAL
EOSINOPHIL # BLD AUTO: 0.2 K/UL (ref 0–0.51)
EOSINOPHIL NFR BLD: 2.4 % (ref 0–6.9)
ERYTHROCYTE [DISTWIDTH] IN BLOOD BY AUTOMATED COUNT: 43.8 FL (ref 35.9–50)
GFR SERPLBLD CREATININE-BSD FMLA CKD-EPI: 96 ML/MIN/1.73 M 2
GLOBULIN SER CALC-MCNC: 2.8 G/DL (ref 1.9–3.5)
GLUCOSE SERPL-MCNC: 127 MG/DL (ref 65–99)
GLUCOSE UR STRIP.AUTO-MCNC: NEGATIVE MG/DL
HCT VFR BLD AUTO: 44.8 % (ref 42–52)
HGB BLD-MCNC: 15.4 G/DL (ref 14–18)
IMM GRANULOCYTES # BLD AUTO: 0.05 K/UL (ref 0–0.11)
IMM GRANULOCYTES NFR BLD AUTO: 0.6 % (ref 0–0.9)
KETONES UR STRIP.AUTO-MCNC: ABNORMAL MG/DL
LEUKOCYTE ESTERASE UR QL STRIP.AUTO: NEGATIVE
LIPASE SERPL-CCNC: 56 U/L (ref 11–82)
LYMPHOCYTES # BLD AUTO: 4.06 K/UL (ref 1–4.8)
LYMPHOCYTES NFR BLD: 47.8 % (ref 22–41)
MCH RBC QN AUTO: 29 PG (ref 27–33)
MCHC RBC AUTO-ENTMCNC: 34.4 G/DL (ref 32.3–36.5)
MCV RBC AUTO: 84.4 FL (ref 81.4–97.8)
MICRO URNS: ABNORMAL
MONOCYTES # BLD AUTO: 0.6 K/UL (ref 0–0.85)
MONOCYTES NFR BLD AUTO: 7.1 % (ref 0–13.4)
NEUTROPHILS # BLD AUTO: 3.54 K/UL (ref 1.82–7.42)
NEUTROPHILS NFR BLD: 41.5 % (ref 44–72)
NITRITE UR QL STRIP.AUTO: NEGATIVE
NRBC # BLD AUTO: 0 K/UL
NRBC BLD-RTO: 0 /100 WBC (ref 0–0.2)
NT-PROBNP SERPL IA-MCNC: 49 PG/ML (ref 0–125)
PH UR STRIP.AUTO: 5.5 [PH] (ref 5–8)
PLATELET # BLD AUTO: 178 K/UL (ref 164–446)
PMV BLD AUTO: 11.5 FL (ref 9–12.9)
POTASSIUM SERPL-SCNC: 3.5 MMOL/L (ref 3.6–5.5)
PROT SERPL-MCNC: 7.1 G/DL (ref 6–8.2)
PROT UR QL STRIP: NEGATIVE MG/DL
RBC # BLD AUTO: 5.31 M/UL (ref 4.7–6.1)
RBC UR QL AUTO: NEGATIVE
SODIUM SERPL-SCNC: 139 MMOL/L (ref 135–145)
SP GR UR STRIP.AUTO: 1.01
TROPONIN T SERPL-MCNC: 7 NG/L (ref 6–19)
TROPONIN T SERPL-MCNC: <6 NG/L (ref 6–19)
UROBILINOGEN UR STRIP.AUTO-MCNC: 0.2 EU/DL
WBC # BLD AUTO: 8.5 K/UL (ref 4.8–10.8)

## 2025-04-04 PROCEDURE — 84484 ASSAY OF TROPONIN QUANT: CPT | Mod: 91

## 2025-04-04 PROCEDURE — 36415 COLL VENOUS BLD VENIPUNCTURE: CPT

## 2025-04-04 PROCEDURE — 99284 EMERGENCY DEPT VISIT MOD MDM: CPT

## 2025-04-04 PROCEDURE — 85025 COMPLETE CBC W/AUTO DIFF WBC: CPT

## 2025-04-04 PROCEDURE — 80053 COMPREHEN METABOLIC PANEL: CPT

## 2025-04-04 PROCEDURE — 81003 URINALYSIS AUTO W/O SCOPE: CPT

## 2025-04-04 PROCEDURE — 70450 CT HEAD/BRAIN W/O DYE: CPT

## 2025-04-04 PROCEDURE — 83880 ASSAY OF NATRIURETIC PEPTIDE: CPT

## 2025-04-04 PROCEDURE — 99283 EMERGENCY DEPT VISIT LOW MDM: CPT

## 2025-04-04 PROCEDURE — 83690 ASSAY OF LIPASE: CPT

## 2025-04-04 PROCEDURE — 93005 ELECTROCARDIOGRAM TRACING: CPT | Mod: TC | Performed by: STUDENT IN AN ORGANIZED HEALTH CARE EDUCATION/TRAINING PROGRAM

## 2025-04-04 ASSESSMENT — FIBROSIS 4 INDEX: FIB4 SCORE: 1.34

## 2025-04-04 NOTE — ED PROVIDER NOTES
"ED Provider Note    CHIEF COMPLAINT  Chief Complaint   Patient presents with    Headache     Work up at 1200 with HA     Shortness of Breath    Other     Trouble speaking     Numbness     Left sided     Chest Pain       EXTERNAL RECORDS REVIEWED  Outpatient notes he has a history of early onset dementia    HPI/ROS  LIMITATION TO HISTORY   None.  OUTSIDE HISTORIAN(S):  Son and Wife    Noe Tom Sr. is a 54 y.o. male who presents headache.  His family says that he woke up from sleep around 12 was complaining of a headache they said that he was short of breath and was speaking words that they said sounded like gibberish.  He was also complaining of numbness worse in the left arm but really all throughout his body no weakness.  He was also having associated chest pain.  Symptoms have more or less completely resolved at this time.  He denies taking any medications.  Never had the symptoms before.      PAST MEDICAL HISTORY   has a past medical history of Alzheimer disease (HCC).    SURGICAL HISTORY  patient denies any surgical history    FAMILY HISTORY  History reviewed. No pertinent family history.    SOCIAL HISTORY  Social History     Tobacco Use    Smoking status: Never    Smokeless tobacco: Never   Vaping Use    Vaping status: Never Used   Substance and Sexual Activity    Alcohol use: Not Currently    Drug use: Never    Sexual activity: Not on file       CURRENT MEDICATIONS  Home Medications       Reviewed by Alicia Spangler R.N. (Registered Nurse) on 04/04/25 at 0055  Med List Status: Not Addressed     Medication Last Dose Status   cyanocobalamin (VITAMIN B-12) 1000 MCG/ML Solution  Active   donepezil (ARICEPT) 10 MG tablet  Active   memantine (NAMENDA) 10 MG Tab  Active                    ALLERGIES  No Known Allergies    PHYSICAL EXAM  VITAL SIGNS: BP 95/45   Pulse 72   Temp 36.7 °C (98.1 °F) (Temporal)   Resp 18   Ht 1.702 m (5' 7\")   Wt 81.7 kg (180 lb 1.9 oz)   SpO2 96%   BMI 28.21 kg/m²  "   Constitutional: Awake and alert. Non toxic  HENT: Normal inspection  Moist mucous membranes  Eyes: Normal inspection  Neck: Grossly normal range of motion.  Cardiovascular: Normal heart rate, Normal rhythm.  Symmetric peripheral pulses.   Thorax & Lungs: No respiratory distress, No wheezing, No rales, No rhonchi  Abdomen: Soft, non-distended, nontender to palpation in all 4 quadrants, no mass  Skin: No obvious rash.  Extremities: Warm, well perfused. No clubbing, cyanosis, edema   Neurologic: A&O to self, (the patient's baseline). He has a symmetric smile. No pronator drift . He has full strength in upper and lower extremities normal sensation to light touch  Psychiatric: Normal for situation      EKG/LABS  Results for orders placed or performed during the hospital encounter of 25   EKG    Collection Time: 25  1:06 AM   Result Value Ref Range    Report       [facility] Emergency Dept.    Test Date:  2025  Pt Name:    SAMANTHA ABRAHAM             Department: [facility]  MRN:        [id number]                      [facility]  Gender:     Male                         Technician: EDUARDO  :        1970                   Requested By:DAYANA CHAVEZ  Order #:    [id number]                    Reading MD:    Measurements  Intervals                                Axis  Rate:       67                           P:          47  KS:         148                          QRS:        48  QRSD:       105                          T:          32  QT:         440  QTc:        465    Interpretive Statements  Pacemaker spikes or artifacts  Sinus rhythm  Probable left atrial enlargement  Minimal ST depression, inferior leads  Artifact in lead(s) II,III,aVR,aVL,aVF and baseline wander in lead(s) V3  No previous ECG available for comparison     CBC WITH DIFFERENTIAL    Collection Time: 25  1:15 AM   Result Value Ref Range    WBC 8.5 4.8 - 10.8 K/uL    RBC 5.31 4.70 - 6.10 M/uL     Hemoglobin 15.4 14.0 - 18.0 g/dL    Hematocrit 44.8 42.0 - 52.0 %    MCV 84.4 81.4 - 97.8 fL    MCH 29.0 27.0 - 33.0 pg    MCHC 34.4 32.3 - 36.5 g/dL    RDW 43.8 35.9 - 50.0 fL    Platelet Count 178 164 - 446 K/uL    MPV 11.5 9.0 - 12.9 fL    Neutrophils-Polys 41.50 (L) 44.00 - 72.00 %    Lymphocytes 47.80 (H) 22.00 - 41.00 %    Monocytes 7.10 0.00 - 13.40 %    Eosinophils 2.40 0.00 - 6.90 %    Basophils 0.60 0.00 - 1.80 %    Immature Granulocytes 0.60 0.00 - 0.90 %    Nucleated RBC 0.00 0.00 - 0.20 /100 WBC    Neutrophils (Absolute) 3.54 1.82 - 7.42 K/uL    Lymphs (Absolute) 4.06 1.00 - 4.80 K/uL    Monos (Absolute) 0.60 0.00 - 0.85 K/uL    Eos (Absolute) 0.20 0.00 - 0.51 K/uL    Baso (Absolute) 0.05 0.00 - 0.12 K/uL    Immature Granulocytes (abs) 0.05 0.00 - 0.11 K/uL    NRBC (Absolute) 0.00 K/uL   COMP METABOLIC PANEL    Collection Time: 04/04/25  1:15 AM   Result Value Ref Range    Sodium 139 135 - 145 mmol/L    Potassium 3.5 (L) 3.6 - 5.5 mmol/L    Chloride 104 96 - 112 mmol/L    Co2 22 20 - 33 mmol/L    Anion Gap 13.0 7.0 - 16.0    Glucose 127 (H) 65 - 99 mg/dL    Bun 19 8 - 22 mg/dL    Creatinine 0.93 0.50 - 1.40 mg/dL    Calcium 9.2 8.5 - 10.5 mg/dL    Correct Calcium 9.0 8.5 - 10.5 mg/dL    AST(SGOT) 26 12 - 45 U/L    ALT(SGPT) 25 2 - 50 U/L    Alkaline Phosphatase 73 30 - 99 U/L    Total Bilirubin 0.3 0.1 - 1.5 mg/dL    Albumin 4.3 3.2 - 4.9 g/dL    Total Protein 7.1 6.0 - 8.2 g/dL    Globulin 2.8 1.9 - 3.5 g/dL    A-G Ratio 1.5 g/dL   LIPASE    Collection Time: 04/04/25  1:15 AM   Result Value Ref Range    Lipase 56 11 - 82 U/L   TROPONIN    Collection Time: 04/04/25  1:15 AM   Result Value Ref Range    Troponin T <6 6 - 19 ng/L   proBrain Natriuretic Peptide, NT    Collection Time: 04/04/25  1:15 AM   Result Value Ref Range    NT-proBNP 49 0 - 125 pg/mL   ESTIMATED GFR    Collection Time: 04/04/25  1:15 AM   Result Value Ref Range    GFR (CKD-EPI) 96 >60 mL/min/1.73 m 2   URINALYSIS    Collection Time:  04/04/25  2:05 AM    Specimen: Urine   Result Value Ref Range    Color Yellow     Character Clear     Specific Gravity 1.015 <1.035    Ph 5.5 5.0 - 8.0    Glucose Negative Negative mg/dL    Ketones Trace (A) Negative mg/dL    Protein Negative Negative mg/dL    Bilirubin Negative Negative    Urobilinogen, Urine 0.2 <=1.0 EU/dL    Nitrite Negative Negative    Leukocyte Esterase Negative Negative    Occult Blood Negative Negative    Micro Urine Req see below    TROPONIN    Collection Time: 04/04/25  2:15 AM   Result Value Ref Range    Troponin T 7 6 - 19 ng/L       I have independently interpreted this EKG    RADIOLOGY/PROCEDURES   I have independently interpreted the diagnostic imaging associated with this visit and am waiting the final reading from the radiologist.   My preliminary interpretation is as follows: No large bleed    Radiologist interpretation:  CT-HEAD W/O   Final Result         1.  No acute intracranial abnormality is identified, there are nonspecific white matter changes, commonly associated with small vessel ischemic disease.  Associated mild cerebral atrophy is noted.   2.  Atherosclerosis.                   COURSE & MEDICAL DECISION MAKING    ASSESSMENT, COURSE AND PLAN  Care Narrative: This is a 34-year-old with early onset show who presents with headache and chest pain.  Symptoms awoke him from sleep around midnight and have since resolved without intervention. On arrival here he has normal vital signs. He is afebrile he is neurologically intact with an NIH stroke score of 1, however this is due to his underlying dementia and he has no acute neurologic deficits or decline.  His EKG is unremarkable without signs of ischemia.  I have low suspicion for ACS as well with 2 normal troponins and complete resolution of pain.  I considered but doubt aortic dissection as he has no significant hypertension, no widened mediastinum on x-ray and no persistent chest pain to suggest this.  Similarly doubt PE as  no hypoxia, tachycardia or other risk factors for this.  His labs are otherwise unremarkable without any significant electrolyte derangements.  He has no leukocytosis or other signs or symptoms of infection.  CT head is negative for intracranial bleed or large mass.  Subarachnoid hemorrhage can be ruled out as patient has presented within 6 hours of symptoms.  He has no altered mentation, fever or toxic appearance to suggest a CNS infection. It is unclear exactly what close the patient's symptoms.  He reportedly did have some trouble speaking but he has no aphasia, dysarthria or other stroke symptoms seems here . Considered but unlikely to be a TIA given the associated headache and chest pain.  Overall however I do feel very reassured by his workup in the ER as do his family he has remained completely asymptomatic with normal vitals and has declined any pain medication or intervention.  I do think he is safe for discharge and follow-up closely with primary doctor.  He and his family members expressed understanding and will return sooner if worsening headache, chest pain, difficulty breathing, neurologic symptoms or other concern                DISPOSITION AND DISCUSSIONS  I have discussed management of the patient with the following physicians and SU's:  none    Discussion of management with other QHP or appropriate source(s): None     Escalation of care considered, and ultimately not performed:acute inpatient care management, however at this time, the patient is most appropriate for outpatient management    Barriers to care at this time, including but not limited to:  none .     Decision tools and prescription drugs considered including, but not limited to:  HEART score 2  .    FINAL DIAGNOSIS  1. Chest pain, unspecified type Acute   2. Acute nonintractable headache, unspecified headache type Acute        Electronically signed by: Eliza Che M.D., 4/4/2025 1:05 AM

## 2025-04-04 NOTE — ED TRIAGE NOTES
Chief Complaint   Patient presents with    Headache     Work up at 1200 with HA     Shortness of Breath    Other     Trouble speaking     Numbness     Left sided     Chest Pain     A&O to self, per family baseline, Pt Hx of alzheimer's.

## 2025-04-04 NOTE — ED NOTES
"Chief Complaint   Patient presents with    Headache     Work up at 1200 with HA     Shortness of Breath    Other     Trouble speaking     Numbness     Left sided     Chest Pain     BP (!) 140/86   Pulse 64   Temp (!) 35.3 °C (95.6 °F) (Temporal)   Resp (!) 25   Ht 1.702 m (5' 7\")   Wt 81.7 kg (180 lb 1.9 oz)   SpO2 96%   BMI 28.21 kg/m²     "

## 2025-04-04 NOTE — ED NOTES
"IV established. Blood sent to lab.  Pt able to CANCINO with +bilat touch sensation. Pt says he\"feels better than before when it started around 0001\"  "

## 2025-05-25 DIAGNOSIS — F02.B0 MODERATE EARLY ONSET ALZHEIMER'S DEMENTIA WITHOUT BEHAVIORAL DISTURBANCE, PSYCHOTIC DISTURBANCE, MOOD DISTURBANCE, OR ANXIETY (HCC): ICD-10-CM

## 2025-05-25 DIAGNOSIS — G30.0 MODERATE EARLY ONSET ALZHEIMER'S DEMENTIA WITHOUT BEHAVIORAL DISTURBANCE, PSYCHOTIC DISTURBANCE, MOOD DISTURBANCE, OR ANXIETY (HCC): ICD-10-CM

## 2025-05-28 RX ORDER — MEMANTINE HYDROCHLORIDE 10 MG/1
TABLET ORAL
Qty: 60 TABLET | Refills: 13 | Status: SHIPPED | OUTPATIENT
Start: 2025-05-28